# Patient Record
Sex: FEMALE | Race: WHITE | Employment: FULL TIME | URBAN - METROPOLITAN AREA
[De-identification: names, ages, dates, MRNs, and addresses within clinical notes are randomized per-mention and may not be internally consistent; named-entity substitution may affect disease eponyms.]

---

## 2017-01-03 ENCOUNTER — PRENATAL OFFICE VISIT (OUTPATIENT)
Dept: OBGYN | Facility: CLINIC | Age: 37
End: 2017-01-03
Payer: COMMERCIAL

## 2017-01-03 VITALS
BODY MASS INDEX: 31.11 KG/M2 | DIASTOLIC BLOOD PRESSURE: 85 MMHG | SYSTOLIC BLOOD PRESSURE: 131 MMHG | HEART RATE: 106 BPM | TEMPERATURE: 97.6 F | HEIGHT: 63 IN | WEIGHT: 175.6 LBS

## 2017-01-03 DIAGNOSIS — Z34.82 PRENATAL CARE, SUBSEQUENT PREGNANCY, SECOND TRIMESTER: Primary | ICD-10-CM

## 2017-01-03 PROCEDURE — 99207 ZZC PRENATAL VISIT: CPT | Performed by: OBSTETRICS & GYNECOLOGY

## 2017-01-03 PROCEDURE — 99000 SPECIMEN HANDLING OFFICE-LAB: CPT | Performed by: OBSTETRICS & GYNECOLOGY

## 2017-01-03 PROCEDURE — 82105 ALPHA-FETOPROTEIN SERUM: CPT | Mod: 90 | Performed by: OBSTETRICS & GYNECOLOGY

## 2017-01-03 PROCEDURE — 36415 COLL VENOUS BLD VENIPUNCTURE: CPT | Performed by: OBSTETRICS & GYNECOLOGY

## 2017-01-03 NOTE — NURSING NOTE
"Chief Complaint   Patient presents with     Prenatal Care     right side pain on and off       Initial /85 mmHg  Pulse 106  Temp(Src) 97.6  F (36.4  C) (Oral)  Ht 5' 3\" (1.6 m)  Wt 175 lb 9.6 oz (79.652 kg)  BMI 31.11 kg/m2  LMP 09/19/2016  Breastfeeding? No Estimated body mass index is 31.11 kg/(m^2) as calculated from the following:    Height as of this encounter: 5' 3\" (1.6 m).    Weight as of this encounter: 175 lb 9.6 oz (79.652 kg).  BP completed using cuff size: regular  Rhiannon Sue CMA      "

## 2017-01-03 NOTE — PROGRESS NOTES
"CC: Here for routine prenatal visit @ 15w1d   HPI: Has head cold.  Negative Verifi.      PE: /85 mmHg  Pulse 106  Temp(Src) 97.6  F (36.4  C) (Oral)  Ht 5' 3\" (1.6 m)  Wt 175 lb 9.6 oz (79.652 kg)  BMI 31.11 kg/m2  LMP 2016  Breastfeeding? No   See OB flowsheet    Desires AFP    A/P  @ 15w1d normal pregnancy    1. Routine prenatal care.  AFP today.  U/S ordered.  2. URI: discussed supportive care    RTC 4 weeks.      Janie Lantigua M.D.    "

## 2017-01-05 LAB
# FETUSES US: NORMAL
AFP ADJ MOM AMN: 0.69
AFP SERPL-MCNC: 18 NG/ML
AGE - REPORTED: 36.1
DATING METHOD: NORMAL
DIABETIC AT CONCEPTION: NO
FAMILY MEMBER DISEASES HX: NO
FAMILY MEMBER DISEASES HX: NO
GA METHOD: NORMAL
GA: 15.14 WK
HX OF HEREDITARY DISORDERS: NO
IDDM PATIENT QL: NO
INTEGRATED SCN PATIENT-IMP: NORMAL
LMP START DATE: 9
PREV HX CHROMOSOME ABNORMALITY: NO
SPECIMEN DRAWN SERPL: NORMAL
TWINS: NO

## 2017-01-20 ENCOUNTER — MYC MEDICAL ADVICE (OUTPATIENT)
Dept: OBGYN | Facility: CLINIC | Age: 37
End: 2017-01-20

## 2017-01-20 DIAGNOSIS — Z34.82 PRENATAL CARE, SUBSEQUENT PREGNANCY, SECOND TRIMESTER: Primary | ICD-10-CM

## 2017-01-20 RX ORDER — METOCLOPRAMIDE 5 MG/1
TABLET ORAL
Qty: 30 TABLET | Refills: 2 | Status: ON HOLD | OUTPATIENT
Start: 2017-01-20 | End: 2017-06-17

## 2017-01-20 NOTE — TELEPHONE ENCOUNTER
"Dr Yan: Can you work this 17 week PG patient in today for Headache, leg swelling and occasional Dizziness?...    Onset of Headaches: \"2 weeks ago\"    Denies history of Headaches, known injuries, blurred vision, afebrile.      Reports: \"I did have some Preclampsia 6 years ago..\"     Rates pain: \"About a 6 now, but it can get to a 20-more in the am..\"   I & O: \"seems fine. I probably drank 4 -32 oz jugs of water yesterday..\"  Is urinating and reports \"about the usual amounts..\" .     \"I am taking 2 regular Tylenol every 4-6 hours and it helps a while, then the pain comes back.\"     Location: \"right in my forehead, but it is worse in the am, at night I want to vomit the pain is so bad..\" \"I have been vomiting a bit in the mornings the past 2 days, too..\"     \"My left leg is very tingly-like I sat on it all day and now my left leg is swelling again this am..\"    \"I get a bit light headed, too...\"    Advised she will need to be seen. Will check with Dr Yan to see if she can see patient today in Dr Lantigua's absence.     Farida Lucas RN          "

## 2017-01-20 NOTE — TELEPHONE ENCOUNTER
Tried to reach patient by Phone, but patient was not available. I was not able to leave a message. Farida Lucas RN

## 2017-01-20 NOTE — TELEPHONE ENCOUNTER
We can try to use reglan for the headaches to see if that helps at all.  Please call this in for her to her pharmacy of choice.      Her blood pressure was higher than a normal person but not in the range of hypertension, and we don't diagnose pre-eclampsia prior to 20 weeks gestation.  If she continues to have headaches despite taking reglan and tylenol, she probably needs to be seen in the emergency room to make sure she doesn't have something else going on -- they may want to do a brain scan or something similar since we wouldn't attribute it to pre-eclampsia at this early gestational age.     Maria D Yan MD

## 2017-02-02 ENCOUNTER — PRENATAL OFFICE VISIT (OUTPATIENT)
Dept: OBGYN | Facility: CLINIC | Age: 37
End: 2017-02-02
Payer: COMMERCIAL

## 2017-02-02 ENCOUNTER — HOSPITAL ENCOUNTER (OUTPATIENT)
Dept: ULTRASOUND IMAGING | Facility: CLINIC | Age: 37
Discharge: HOME | End: 2017-02-02
Attending: OBSTETRICS & GYNECOLOGY | Admitting: OBSTETRICS & GYNECOLOGY
Payer: COMMERCIAL

## 2017-02-02 VITALS
HEIGHT: 63 IN | WEIGHT: 174.8 LBS | HEART RATE: 89 BPM | BODY MASS INDEX: 30.97 KG/M2 | DIASTOLIC BLOOD PRESSURE: 73 MMHG | SYSTOLIC BLOOD PRESSURE: 113 MMHG

## 2017-02-02 DIAGNOSIS — O09.522 AMA (ADVANCED MATERNAL AGE) MULTIGRAVIDA 35+, SECOND TRIMESTER: ICD-10-CM

## 2017-02-02 DIAGNOSIS — Z34.82 PRENATAL CARE, SUBSEQUENT PREGNANCY, SECOND TRIMESTER: ICD-10-CM

## 2017-02-02 DIAGNOSIS — Z34.82 PRENATAL CARE, SUBSEQUENT PREGNANCY, SECOND TRIMESTER: Primary | ICD-10-CM

## 2017-02-02 PROCEDURE — 76805 OB US >/= 14 WKS SNGL FETUS: CPT

## 2017-02-02 PROCEDURE — 99207 ZZC PRENATAL VISIT: CPT | Performed by: OBSTETRICS & GYNECOLOGY

## 2017-02-02 NOTE — PROGRESS NOTES
"CC: Here for routine prenatal visit @ 19w3d   HPI: + FM/flutter, no ctx, no LOF, no VB.  Gets occasional sharp pains     PE: /73 mmHg  Pulse 89  Ht 5' 3\" (1.6 m)  Wt 174 lb 12.8 oz (79.289 kg)  BMI 30.97 kg/m2  LMP 2016  Breastfeeding? No   See OB flowsheet    AFP screen negative  U/S: WNL except EIF    A/P  @ 19w3d normal pregnancy    1. Routine prenatal care.  Normal Verifi.  Patient reassured regarding finding of EIF.  No other structural defects noted.     RTC 4 weeks.      Janie Lantigua M.D.    "

## 2017-02-02 NOTE — NURSING NOTE
"Chief Complaint   Patient presents with     Prenatal Care     pain on upper left abdomin, also having a hard time eating       Initial /73 mmHg  Pulse 89  Ht 5' 3\" (1.6 m)  Wt 174 lb 12.8 oz (79.289 kg)  BMI 30.97 kg/m2  LMP 09/19/2016  Breastfeeding? No Estimated body mass index is 30.97 kg/(m^2) as calculated from the following:    Height as of this encounter: 5' 3\" (1.6 m).    Weight as of this encounter: 174 lb 12.8 oz (79.289 kg).  BP completed using cuff size: regular  Rhiannno Sue CMA      "

## 2017-02-10 ENCOUNTER — MYC MEDICAL ADVICE (OUTPATIENT)
Dept: OBGYN | Facility: CLINIC | Age: 37
End: 2017-02-10

## 2017-02-10 ENCOUNTER — HOSPITAL ENCOUNTER (OUTPATIENT)
Dept: OBGYN | Facility: HOSPITAL | Age: 37
Discharge: HOME | End: 2017-02-10
Attending: FAMILY MEDICINE | Admitting: FAMILY MEDICINE

## 2017-02-10 DIAGNOSIS — R10.9 FLANK PAIN: ICD-10-CM

## 2017-02-10 ASSESSMENT — MIFFLIN-ST. JEOR: SCORE: 1448.79

## 2017-02-10 NOTE — TELEPHONE ENCOUNTER
Spoke with patient on the phone.    S-(situation): left sided pain under rib cage/breast, worse at night, radiates to back    B-(background):  at about 20.4 weeks pregnant    A-(assessment): Patient reports headaches off and on, nothing consistent, since about 17 weeks. Patient denies any visual changes. Patient reports pain is on left side under breast. Saw MD 2 weeks ago and discussed. Patient repots pain has been worsening, especially at night. Now almost unbearable.  Patient reports at night, pain increased, difficult to sleep or lay down.   Patient reports Tylenol is not helping, has not tried heat.  Patient reports some swelling in feet that comes and goes, more when she is on her feet all day. Patient reports + fetal movement. Patient is not bleeding or having any leaking of fluid. Patient reports she has not been sick, denies fevers or coughing. Patient reports some times the pain makes it difficult to breathe deep.    R-(recommendations): Advised patient to be seen for further evaluation. Patient reports understanding.    Chandni Hair   [facility]  RN

## 2017-03-02 ENCOUNTER — PRENATAL OFFICE VISIT (OUTPATIENT)
Dept: OBGYN | Facility: CLINIC | Age: 37
End: 2017-03-02
Payer: COMMERCIAL

## 2017-03-02 VITALS
HEIGHT: 63 IN | WEIGHT: 179.6 LBS | SYSTOLIC BLOOD PRESSURE: 115 MMHG | BODY MASS INDEX: 31.82 KG/M2 | HEART RATE: 107 BPM | DIASTOLIC BLOOD PRESSURE: 78 MMHG

## 2017-03-02 DIAGNOSIS — O09.522 AMA (ADVANCED MATERNAL AGE) MULTIGRAVIDA 35+, SECOND TRIMESTER: ICD-10-CM

## 2017-03-02 DIAGNOSIS — Z34.82 PRENATAL CARE, SUBSEQUENT PREGNANCY, SECOND TRIMESTER: ICD-10-CM

## 2017-03-02 LAB
GLUCOSE 1H P 50 G GLC PO SERPL-MCNC: 134 MG/DL (ref 60–129)
HGB BLD-MCNC: 10 G/DL (ref 11.7–15.7)

## 2017-03-02 PROCEDURE — 36415 COLL VENOUS BLD VENIPUNCTURE: CPT | Performed by: OBSTETRICS & GYNECOLOGY

## 2017-03-02 PROCEDURE — 99207 ZZC PRENATAL VISIT: CPT | Performed by: OBSTETRICS & GYNECOLOGY

## 2017-03-02 PROCEDURE — 82950 GLUCOSE TEST: CPT | Performed by: OBSTETRICS & GYNECOLOGY

## 2017-03-02 PROCEDURE — 00000218 ZZHCL STATISTIC OBHBG - HEMOGLOBIN: Performed by: OBSTETRICS & GYNECOLOGY

## 2017-03-02 NOTE — PROGRESS NOTES
"CC: Here for routine prenatal visit @ 23w3d   HPI: + FM, no ctx, no LOF, no VB.  No complaints.     PE: /78 (BP Location: Left arm, Patient Position: Chair, Cuff Size: Adult Large)  Pulse 107  Ht 5' 3\" (1.6 m)  Wt 179 lb 9.6 oz (81.5 kg)  LMP 2016  Breastfeeding? No  BMI 31.81 kg/m2   See OB flowsheet    GTT in progress    A/P  @ 23w3d normal pregnancy    1. Routine prenatal care.  Syphilis is a sexually transmitted disease that can cause birth defects in the babies of untreated mothers. Every pregnant patient is tested for syphilis early in each pregnancy as part of the routine lab work. The [place] [facility] of [facility] has seen an increase in the rate of syphilis in [place]. The [facility] now recommends testing for syphilis 3 times during a pregnancy, the new prenatal visit, 28 weeks and when admitted for delivery. Patient declines lab testing for syphilis.     RTC 4 weeks.      Janie Lantigua M.D.    "

## 2017-03-02 NOTE — NURSING NOTE
"Chief Complaint   Patient presents with     Prenatal Care       Initial /78 (BP Location: Left arm, Patient Position: Chair, Cuff Size: Adult Large)  Pulse 107  Ht 5' 3\" (1.6 m)  Wt 179 lb 9.6 oz (81.5 kg)  LMP 09/19/2016  Breastfeeding? No  BMI 31.81 kg/m2 Estimated body mass index is 31.81 kg/(m^2) as calculated from the following:    Height as of this encounter: 5' 3\" (1.6 m).    Weight as of this encounter: 179 lb 9.6 oz (81.5 kg).  Medication Reconciliation: complete   Sheri Russo, SEUN      "

## 2017-03-02 NOTE — MR AVS SNAPSHOT
After Visit Summary   3/2/2017    Jennifer A Schwab    MRN: [id number]           Patient Information     Date Of Birth          1980        Visit Information        Provider Department      3/2/2017 1:15 PM Janie Lantigua MD [facility]        Today's Diagnoses     AMA (advanced maternal age) multigravida 35+, second trimester        Prenatal care, subsequent pregnancy, second trimester           Follow-ups after your visit        Your next 10 appointments already scheduled     Apr 04, 2017  3:45 PM CDT   ESTABLISHED PRENATAL with Janie Lantigua MD   [facility]    [address] [address]   [phone]              Who to contact     If you have questions or need follow up information about today's clinic visit or your schedule please contact [facility] directly at [phone].  Normal or non-critical lab and imaging results will be communicated to you by MyChart, letter or phone within 4 business days after the clinic has received the results. If you do not hear from us within 7 days, please contact the clinic through Clarityhart or phone. If you have a critical or abnormal lab result, we will notify you by phone as soon as possible.  Submit refill requests through Inc[employer] or call your pharmacy and they will forward the refill request to us. Please allow 3 business days for your refill to be completed.          Additional Information About Your Visit        MyChart Information     [employer] gives you secure access to your electronic health record. If you see a primary care provider, you can also send messages to your care team and make appointments. If you have questions, please call your primary care clinic.  If you do not have a primary care provider, please call [phone] and they will assist you.        Care EveryWhere ID     This is your Care EveryWhere ID. This could be used by other  "organizations to access your [place] medical records  [id number]        Your Vitals Were     Pulse Height Last Period Breastfeeding? BMI (Body Mass Index)       107 5' 3\" (1.6 m) 09/19/2016 No 31.81 kg/m2        Blood Pressure from Last 3 Encounters:   03/02/17 115/78   02/02/17 113/73   01/03/17 131/85    Weight from Last 3 Encounters:   03/02/17 179 lb 9.6 oz (81.5 kg)   02/02/17 174 lb 12.8 oz (79.3 kg)   01/03/17 175 lb 9.6 oz (79.7 kg)              We Performed the Following     Glucose tolerance gest screen 1 hour     OB hemoglobin        Primary Care Provider Office Phone # Fax #    Janie Lantigua [phone]       [facility] [address]  [place] [address]        Thank you!     Thank you for choosing [facility]  for your care. Our goal is always to provide you with excellent care. Hearing back from our patients is one way we can continue to improve our services. Please take a few minutes to complete the written survey that you may receive in the mail after your visit with us. Thank you!             Your Updated Medication List - Protect others around you: Learn how to safely use, store and throw away your medicines at www.disposemymeds.org.          This list is accurate as of: 3/2/17  2:00 PM.  Always use your most recent med list.                   Brand Name Dispense Instructions for use    cetirizine 10 MG tablet    zyrTEC     Take 10 mg by mouth daily       metoclopramide 5 MG tablet    REGLAN    30 tablet    5-10 mg by mouth every 6 hours as needed for headache       nicotine 7 MG/24HR 24 hr patch    NICODERM CQ    30 patch    Place 1 patch onto the skin every 24 hours       ondansetron 4 MG ODT tab    ZOFRAN ODT    30 tablet    Take 1-2 tablets (4-8 mg) by mouth every 6 hours as needed for nausea       prenatal multivitamin  plus iron 27-0.8 MG Tabs per tablet      Take 1 tablet by mouth daily       PREVACID PO      Take 15 mg by mouth every " morning (before breakfast)

## 2017-03-06 ENCOUNTER — TELEPHONE (OUTPATIENT)
Dept: OBGYN | Facility: CLINIC | Age: 37
End: 2017-03-06

## 2017-03-06 DIAGNOSIS — Z34.82 PRENATAL CARE, SUBSEQUENT PREGNANCY, SECOND TRIMESTER: ICD-10-CM

## 2017-03-06 LAB
GLUCOSE 1H P 100 G GLC PO SERPL-MCNC: 133 MG/DL (ref 60–179)
GLUCOSE 2H P 100 G GLC PO SERPL-MCNC: 100 MG/DL (ref 60–154)
GLUCOSE 3H P 100 G GLC PO SERPL-MCNC: 45 MG/DL (ref 60–139)
GLUCOSE P FAST SERPL-MCNC: 74 MG/DL (ref 60–94)

## 2017-03-06 PROCEDURE — 82952 GTT-ADDED SAMPLES: CPT | Performed by: OBSTETRICS & GYNECOLOGY

## 2017-03-06 PROCEDURE — 36415 COLL VENOUS BLD VENIPUNCTURE: CPT | Performed by: OBSTETRICS & GYNECOLOGY

## 2017-03-06 PROCEDURE — 82951 GLUCOSE TOLERANCE TEST (GTT): CPT | Performed by: OBSTETRICS & GYNECOLOGY

## 2017-03-06 NOTE — TELEPHONE ENCOUNTER
"Patient notified she passed the 3 hr gtt.  Patient is feeling better.  She was able to eat since the testing finished and is doing \" a lot better than when I left.\"    Chandni Hair   [facility]  RN    "

## 2017-03-06 NOTE — TELEPHONE ENCOUNTER
Reason for Call:  Other      Detailed comments: Pt returning call - She thinks she was called regarding lab results    Phone Number Patient can be reached at: Home number on file [phone] (home)    Best Time: Any    Can we leave a detailed message on this number? Not Applicable    Call taken on 3/6/2017 at 1:33 PM by Denise Behrendt

## 2017-04-04 ENCOUNTER — PRENATAL OFFICE VISIT (OUTPATIENT)
Dept: OBGYN | Facility: CLINIC | Age: 37
End: 2017-04-04
Payer: COMMERCIAL

## 2017-04-04 VITALS
HEART RATE: 103 BPM | SYSTOLIC BLOOD PRESSURE: 117 MMHG | DIASTOLIC BLOOD PRESSURE: 78 MMHG | HEIGHT: 63 IN | WEIGHT: 182.2 LBS | BODY MASS INDEX: 32.28 KG/M2

## 2017-04-04 DIAGNOSIS — Z34.83 PRENATAL CARE, SUBSEQUENT PREGNANCY, THIRD TRIMESTER: Primary | ICD-10-CM

## 2017-04-04 DIAGNOSIS — Z23 NEED FOR TDAP VACCINATION: ICD-10-CM

## 2017-04-04 PROCEDURE — 99207 ZZC PRENATAL VISIT: CPT | Performed by: OBSTETRICS & GYNECOLOGY

## 2017-04-04 PROCEDURE — 90715 TDAP VACCINE 7 YRS/> IM: CPT | Performed by: OBSTETRICS & GYNECOLOGY

## 2017-04-04 PROCEDURE — 90471 IMMUNIZATION ADMIN: CPT | Performed by: OBSTETRICS & GYNECOLOGY

## 2017-04-04 NOTE — PROGRESS NOTES
"CC: Here for routine prenatal visit @ 28w1d   HPI: + FM, no ctx, no LOF, no VB.  Back issues and lots of pressure     PE: /78 (BP Location: Left arm, Patient Position: Chair, Cuff Size: Adult Regular)  Pulse 103  Ht 5' 3\" (1.6 m)  Wt 182 lb 3.2 oz (82.6 kg)  LMP 2016  Breastfeeding? No  BMI 32.28 kg/m2   See OB flowsheet    A/P  @ 28w1d normal pregnancy    1. Routine prenatal care.  Discussed aches/pains of pregnancy.  Gave pregnancy exercises to patient.  Discussed possible SI belt.  Patient declines for now.     RTC 4 weeks.      Janie Lantigua M.D.    "

## 2017-04-04 NOTE — NURSING NOTE
"Chief Complaint   Patient presents with     Prenatal Care     has been having a lot of back pain and low pelvic pressure, also concerned that baby is still breach       Initial /78 (BP Location: Left arm, Patient Position: Chair, Cuff Size: Adult Regular)  Pulse 103  Ht 5' 3\" (1.6 m)  Wt 182 lb 3.2 oz (82.6 kg)  LMP 09/19/2016  Breastfeeding? No  BMI 32.28 kg/m2 Estimated body mass index is 32.28 kg/(m^2) as calculated from the following:    Height as of this encounter: 5' 3\" (1.6 m).    Weight as of this encounter: 182 lb 3.2 oz (82.6 kg).  Medication Reconciliation: complete   Rhiannon Sue CMA      "

## 2017-04-04 NOTE — MR AVS SNAPSHOT
After Visit Summary   4/4/2017    Jennifer A Schwab    MRN: [id number]           Patient Information     Date Of Birth          1980        Visit Information        Provider Department      4/4/2017 3:45 PM Janie Lantigua MD [facility]        Today's Diagnoses     Prenatal care, subsequent pregnancy, third trimester    -  1    Need for Tdap vaccination           Follow-ups after your visit        Your next 10 appointments already scheduled     Apr 18, 2017  9:30 AM CDT   ESTABLISHED PRENATAL with Janie Lantigua MD   [facility]    [address] [address]   [phone]            May 02, 2017  4:00 PM CDT   ESTABLISHED PRENATAL with Janie Lantigua MD   [facility]    [address] [address]   [phone]            May 16, 2017  4:00 PM CDT   ESTABLISHED PRENATAL with Janie Lantigua MD   [facility]    [address] [address]   [phone]            May 30, 2017  4:00 PM CDT   ESTABLISHED PRENATAL with Janie Lantigua MD   [facility]    [address] [address]   [phone]              Who to contact     If you have questions or need follow up information about today's clinic visit or your schedule please contact [facility] directly at [phone].  Normal or non-critical lab and imaging results will be communicated to you by [employer] - Smartphone RFIDhart, letter or phone within 4 business days after the clinic has received the results. If you do not hear from us within 7 days, please contact the clinic through [employer] - Smartphone RFIDhart or phone. If you have a critical or abnormal lab result, we will notify you by phone as soon as possible.  Submit refill requests through eMagin or call your pharmacy and they will forward the refill  "request to us. Please allow 3 business days for your refill to be completed.          Additional Information About Your Visit        [employer] Information     [employer] gives you secure access to your electronic health record. If you see a primary care provider, you can also send messages to your care team and make appointments. If you have questions, please call your primary care clinic.  If you do not have a primary care provider, please call [phone] and they will assist you.        Care EveryWhere ID     This is your Care EveryWhere ID. This could be used by other organizations to access your [place] medical records  [id number]        Your Vitals Were     Pulse Height Last Period Breastfeeding? BMI (Body Mass Index)       103 5' 3\" (1.6 m) 09/19/2016 No 32.28 kg/m2        Blood Pressure from Last 3 Encounters:   04/04/17 117/78   03/02/17 115/78   02/02/17 113/73    Weight from Last 3 Encounters:   04/04/17 182 lb 3.2 oz (82.6 kg)   03/02/17 179 lb 9.6 oz (81.5 kg)   02/02/17 174 lb 12.8 oz (79.3 kg)              We Performed the Following     ADMIN 1st VACCINE     TDAP VACCINE (ADACEL)        Primary Care Provider Office Phone # Fax #    Janie Lantigua [phone]       [facility] [address] [address]        Thank you!     Thank you for choosing [facility]  for your care. Our goal is always to provide you with excellent care. Hearing back from our patients is one way we can continue to improve our services. Please take a few minutes to complete the written survey that you may receive in the mail after your visit with us. Thank you!             Your Updated Medication List - Protect others around you: Learn how to safely use, store and throw away your medicines at www.disposemymeds.org.          This list is accurate as of: 4/4/17  5:13 PM.  Always use your most recent med list.                   Brand Name Dispense Instructions for use    " cetirizine 10 MG tablet    zyrTEC     Take 10 mg by mouth daily       metoclopramide 5 MG tablet    REGLAN    30 tablet    5-10 mg by mouth every 6 hours as needed for headache       nicotine 7 MG/24HR 24 hr patch    NICODERM CQ    30 patch    Place 1 patch onto the skin every 24 hours       ondansetron 4 MG ODT tab    ZOFRAN ODT    30 tablet    Take 1-2 tablets (4-8 mg) by mouth every 6 hours as needed for nausea       prenatal multivitamin  plus iron 27-0.8 MG Tabs per tablet      Take 1 tablet by mouth daily       PREVACID PO      Take 15 mg by mouth every morning (before breakfast)

## 2017-04-18 ENCOUNTER — PRENATAL OFFICE VISIT (OUTPATIENT)
Dept: OBGYN | Facility: CLINIC | Age: 37
End: 2017-04-18
Payer: COMMERCIAL

## 2017-04-18 VITALS
WEIGHT: 182 LBS | SYSTOLIC BLOOD PRESSURE: 105 MMHG | HEART RATE: 104 BPM | DIASTOLIC BLOOD PRESSURE: 67 MMHG | BODY MASS INDEX: 32.25 KG/M2 | HEIGHT: 63 IN

## 2017-04-18 DIAGNOSIS — Z34.83 PRENATAL CARE, SUBSEQUENT PREGNANCY, THIRD TRIMESTER: Primary | ICD-10-CM

## 2017-04-18 PROCEDURE — 99207 ZZC PRENATAL VISIT: CPT | Performed by: OBSTETRICS & GYNECOLOGY

## 2017-04-18 NOTE — MR AVS SNAPSHOT
After Visit Summary   4/18/2017    Jennifer A Schwab    MRN: [id number]           Patient Information     Date Of Birth          1980        Visit Information        Provider Department      4/18/2017 9:30 AM Janie Lantigua MD [facility]        Today's Diagnoses     Prenatal care, subsequent pregnancy, third trimester    -  1      Care Instructions    Return in two weeks.        Follow-ups after your visit        Your next 10 appointments already scheduled     May 02, 2017  4:00 PM CDT   ESTABLISHED PRENATAL with Janie Lantigua MD   [facility] ([facility]    [address] [address]   [phone]            May 16, 2017  4:00 PM CDT   ESTABLISHED PRENATAL with Janie Lantigua MD   [facility] ([facility]    [address] [address]   [phone]            May 30, 2017  4:00 PM CDT   ESTABLISHED PRENATAL with Janie Lantigua MD   [facility] ([facility])    [address] [address]   [phone]              Who to contact     If you have questions or need follow up information about today's clinic visit or your schedule please contact [facility] directly at [phone].  Normal or non-critical lab and imaging results will be communicated to you by MyChart, letter or phone within 4 business days after the clinic has received the results. If you do not hear from us within 7 days, please contact the clinic through MyChart or phone. If you have a critical or abnormal lab result, we will notify you by phone as soon as possible.  Submit refill requests through [employer] or call your pharmacy and they will forward the refill request to us. Please allow 3 business days for your refill to be completed.          Additional Information About Your Visit        [employer] Information     [employer] gives you secure access to  "your electronic health record. If you see a primary care provider, you can also send messages to your care team and make appointments. If you have questions, please call your primary care clinic.  If you do not have a primary care provider, please call [phone] and they will assist you.        Care EveryWhere ID     This is your Care EveryWhere ID. This could be used by other organizations to access your [place] medical records  [id number]        Your Vitals Were     Pulse Height Last Period BMI (Body Mass Index)          104 5' 3\" (1.6 m) 09/19/2016 32.24 kg/m2         Blood Pressure from Last 3 Encounters:   04/18/17 105/67   04/04/17 117/78   03/02/17 115/78    Weight from Last 3 Encounters:   04/18/17 182 lb (82.6 kg)   04/04/17 182 lb 3.2 oz (82.6 kg)   03/02/17 179 lb 9.6 oz (81.5 kg)              Today, you had the following     No orders found for display       Primary Care Provider Office Phone # Fax #    Janie Lantigua [phone]       [facility] [address] [place] [address]        Thank you!     Thank you for choosing [facility]  for your care. Our goal is always to provide you with excellent care. Hearing back from our patients is one way we can continue to improve our services. Please take a few minutes to complete the written survey that you may receive in the mail after your visit with us. Thank you!             Your Updated Medication List - Protect others around you: Learn how to safely use, store and throw away your medicines at www.disposemymeds.org.          This list is accurate as of: 4/18/17  9:50 AM.  Always use your most recent med list.                   Brand Name Dispense Instructions for use    cetirizine 10 MG tablet    zyrTEC     Take 10 mg by mouth daily       metoclopramide 5 MG tablet    REGLAN    30 tablet    5-10 mg by mouth every 6 hours as needed for headache       nicotine 7 MG/24HR 24 hr patch    NICODERM CQ    30 " patch    Place 1 patch onto the skin every 24 hours       ondansetron 4 MG ODT tab    ZOFRAN ODT    30 tablet    Take 1-2 tablets (4-8 mg) by mouth every 6 hours as needed for nausea       prenatal multivitamin  plus iron 27-0.8 MG Tabs per tablet      Take 1 tablet by mouth daily       PREVACID PO      Take 15 mg by mouth every morning (before breakfast)

## 2017-04-18 NOTE — NURSING NOTE
"Initial /67 (BP Location: Right arm, Patient Position: Chair, Cuff Size: Adult Large)  Pulse 104  Ht 5' 3\" (1.6 m)  Wt 182 lb (82.6 kg)  LMP 09/19/2016  BMI 32.24 kg/m2 Estimated body mass index is 32.24 kg/(m^2) as calculated from the following:    Height as of this encounter: 5' 3\" (1.6 m).    Weight as of this encounter: 182 lb (82.6 kg). .      "

## 2017-04-18 NOTE — PROGRESS NOTES
"CC: Here for routine prenatal visit @ 30w1d   HPI: + FM, no ctx, no LOF, no VB.  No new complaints.  Back has calmed down a little.    PE: /67 (BP Location: Right arm, Patient Position: Chair, Cuff Size: Adult Large)  Pulse 104  Ht 5' 3\" (1.6 m)  Wt 182 lb (82.6 kg)  LMP 2016  BMI 32.24 kg/m2   See OB flowsheet    A/P  @ 30w1d normal pregnancy    1. Routine prenatal care    RTC 2 weeks.      Janie Lantigua M.D.    "

## 2017-05-02 ENCOUNTER — PRENATAL OFFICE VISIT (OUTPATIENT)
Dept: OBGYN | Facility: CLINIC | Age: 37
End: 2017-05-02
Payer: COMMERCIAL

## 2017-05-02 VITALS
HEIGHT: 63 IN | DIASTOLIC BLOOD PRESSURE: 58 MMHG | SYSTOLIC BLOOD PRESSURE: 103 MMHG | HEART RATE: 96 BPM | BODY MASS INDEX: 32.78 KG/M2 | WEIGHT: 185 LBS

## 2017-05-02 DIAGNOSIS — O09.523 AMA (ADVANCED MATERNAL AGE) MULTIGRAVIDA 35+, THIRD TRIMESTER: ICD-10-CM

## 2017-05-02 DIAGNOSIS — Z34.83 PRENATAL CARE, SUBSEQUENT PREGNANCY, THIRD TRIMESTER: ICD-10-CM

## 2017-05-02 PROCEDURE — 99207 ZZC PRENATAL VISIT: CPT | Performed by: OBSTETRICS & GYNECOLOGY

## 2017-05-02 NOTE — NURSING NOTE
"Initial /58 (BP Location: Right arm, Patient Position: Chair, Cuff Size: Adult Large)  Pulse 96  Ht 5' 3\" (1.6 m)  Wt 185 lb (83.9 kg)  LMP 09/19/2016  BMI 32.77 kg/m2 Estimated body mass index is 32.77 kg/(m^2) as calculated from the following:    Height as of this encounter: 5' 3\" (1.6 m).    Weight as of this encounter: 185 lb (83.9 kg). .      "

## 2017-05-02 NOTE — MR AVS SNAPSHOT
After Visit Summary   5/2/2017    Jennifer A Schwab    MRN: [id number]           Patient Information     Date Of Birth          1980        Visit Information        Provider Department      5/2/2017 4:00 PM Janie Lantigua MD [facility]        Today's Diagnoses     Prenatal care, subsequent pregnancy, third trimester        AMA (advanced maternal age) multigravida 35+, third trimester          Care Instructions    Return in two weeks.        Follow-ups after your visit        Your next 10 appointments already scheduled     May 16, 2017  4:00 PM CDT   ESTABLISHED PRENATAL with Janie Lantigua MD   [facility] ([facility])    [address] [address]   [phone]            May 30, 2017  4:00 PM CDT   ESTABLISHED PRENATAL with Janie Lantigua MD   [facility] ([facility])    [address] [address]   [phone]              Who to contact     If you have questions or need follow up information about today's clinic visit or your schedule please contact [facility] directly at [phone].  Normal or non-critical lab and imaging results will be communicated to you by MyChart, letter or phone within 4 business days after the clinic has received the results. If you do not hear from us within 7 days, please contact the clinic through [employer] or phone. If you have a critical or abnormal lab result, we will notify you by phone as soon as possible.  Submit refill requests through [employer] or call your pharmacy and they will forward the refill request to us. Please allow 3 business days for your refill to be completed.          Additional Information About Your Visit        MyChart Information     [employer] gives you secure access to your electronic health record. If you see a primary care provider, you can also send messages to your care team and make appointments. If you  "have questions, please call your primary care clinic.  If you do not have a primary care provider, please call [phone] and they will assist you.        Care EveryWhere ID     This is your Care EveryWhere ID. This could be used by other organizations to access your [place] medical records  [id number]        Your Vitals Were     Pulse Height Last Period BMI (Body Mass Index)          96 5' 3\" (1.6 m) 09/19/2016 32.77 kg/m2         Blood Pressure from Last 3 Encounters:   05/02/17 103/58   04/18/17 105/67   04/04/17 117/78    Weight from Last 3 Encounters:   05/02/17 185 lb (83.9 kg)   04/18/17 182 lb (82.6 kg)   04/04/17 182 lb 3.2 oz (82.6 kg)              Today, you had the following     No orders found for display       Primary Care Provider Office Phone # Fax #    Janie Lantigua [phone]       [facility] [address] [place] [address]        Thank you!     Thank you for choosing [facility]  for your care. Our goal is always to provide you with excellent care. Hearing back from our patients is one way we can continue to improve our services. Please take a few minutes to complete the written survey that you may receive in the mail after your visit with us. Thank you!             Your Updated Medication List - Protect others around you: Learn how to safely use, store and throw away your medicines at www.disposemymeds.org.          This list is accurate as of: 5/2/17  4:52 PM.  Always use your most recent med list.                   Brand Name Dispense Instructions for use    cetirizine 10 MG tablet    zyrTEC     Take 10 mg by mouth daily       metoclopramide 5 MG tablet    REGLAN    30 tablet    5-10 mg by mouth every 6 hours as needed for headache       nicotine 7 MG/24HR 24 hr patch    NICODERM CQ    30 patch    Place 1 patch onto the skin every 24 hours       ondansetron 4 MG ODT tab    ZOFRAN ODT    30 tablet    Take 1-2 tablets (4-8 mg) by mouth " every 6 hours as needed for nausea       prenatal multivitamin  plus iron 27-0.8 MG Tabs per tablet      Take 1 tablet by mouth daily Reported on 5/2/2017       PREVACID PO      Take 15 mg by mouth every morning (before breakfast)

## 2017-05-02 NOTE — PROGRESS NOTES
"CC: Here for routine prenatal visit @ 32w1d   HPI: + FM, no ctx, no LOF, no VB.  No new complaints.     PE: /58 (BP Location: Right arm, Patient Position: Chair, Cuff Size: Adult Large)  Pulse 96  Ht 5' 3\" (1.6 m)  Wt 185 lb (83.9 kg)  LMP 2016  BMI 32.77 kg/m2   See OB flowsheet    A/P  @ 32w1d normal pregnancy    1. Routine prenatal care    RTC 2 weeks.      Janie Lantigua M.D.    "

## 2017-05-16 ENCOUNTER — PRENATAL OFFICE VISIT (OUTPATIENT)
Dept: OBGYN | Facility: CLINIC | Age: 37
End: 2017-05-16
Payer: COMMERCIAL

## 2017-05-16 VITALS
WEIGHT: 184.2 LBS | BODY MASS INDEX: 32.63 KG/M2 | HEART RATE: 99 BPM | DIASTOLIC BLOOD PRESSURE: 67 MMHG | SYSTOLIC BLOOD PRESSURE: 110 MMHG

## 2017-05-16 DIAGNOSIS — O09.523 AMA (ADVANCED MATERNAL AGE) MULTIGRAVIDA 35+, THIRD TRIMESTER: ICD-10-CM

## 2017-05-16 DIAGNOSIS — Z34.83 PRENATAL CARE, SUBSEQUENT PREGNANCY, THIRD TRIMESTER: Primary | ICD-10-CM

## 2017-05-16 PROCEDURE — 99207 ZZC PRENATAL VISIT: CPT | Performed by: OBSTETRICS & GYNECOLOGY

## 2017-05-16 NOTE — PROGRESS NOTES
Doing well.   Reports intense pelvic pressure  Denies VB, ctx, LOF. +FM  /67 (BP Location: Right arm, Patient Position: Chair, Cuff Size: Adult Regular)  Pulse 99  Wt 184 lb 3.2 oz (83.6 kg)  LMP 2016  BMI 32.63 kg/m2  General Appearance: NAD  Abdomen: Gravid, NT  Refer to flow sheet above.   A/P: 36 year old  at 34w1d  -- concerns addressed above, reassurance provided  -- PTL precautions reviewed  RTC in 2 weeks     Thong Reed MD  [facility]

## 2017-05-16 NOTE — MR AVS SNAPSHOT
After Visit Summary   5/16/2017    Jennifer A Schwab    MRN: [id number]           Patient Information     Date Of Birth          1980        Visit Information        Provider Department      5/16/2017 4:00 PM Thong Reed MD [facility]        Today's Diagnoses     Prenatal care, subsequent pregnancy, third trimester    -  1    AMA (advanced maternal age) multigravida 35+, third trimester           Follow-ups after your visit        Your next 10 appointments already scheduled     May 30, 2017  4:00 PM CDT   ESTABLISHED PRENATAL with Janie Lantigua MD   [facility] ([facility])    [address] [address] [phone]              Who to contact     If you have questions or need follow up information about today's clinic visit or your schedule please contact [facility] directly at [phone].  Normal or non-critical lab and imaging results will be communicated to you by MyChart, letter or phone within 4 business days after the clinic has received the results. If you do not hear from us within 7 days, please contact the clinic through [employer] or phone. If you have a critical or abnormal lab result, we will notify you by phone as soon as possible.  Submit refill requests through [employer] or call your pharmacy and they will forward the refill request to us. Please allow 3 business days for your refill to be completed.          Additional Information About Your Visit        MyChart Information     [employer] gives you secure access to your electronic health record. If you see a primary care provider, you can also send messages to your care team and make appointments. If you have questions, please call your primary care clinic.  If you do not have a primary care provider, please call [phone] and they will assist you.        Care EveryWhere ID     This is your Care EveryWhere ID. This could be used by other  organizations to access your [place] medical records  [id number]        Your Vitals Were     Pulse Last Period BMI (Body Mass Index)             99 09/19/2016 32.63 kg/m2          Blood Pressure from Last 3 Encounters:   05/16/17 110/67   05/02/17 103/58   04/18/17 105/67    Weight from Last 3 Encounters:   05/16/17 184 lb 3.2 oz (83.6 kg)   05/02/17 185 lb (83.9 kg)   04/18/17 182 lb (82.6 kg)              Today, you had the following     No orders found for display       Primary Care Provider Office Phone # Fax #    Janie Lantigua [phone]       [facility] [address] [address]        Thank you!     Thank you for choosing [facility]  for your care. Our goal is always to provide you with excellent care. Hearing back from our patients is one way we can continue to improve our services. Please take a few minutes to complete the written survey that you may receive in the mail after your visit with us. Thank you!             Your Updated Medication List - Protect others around you: Learn how to safely use, store and throw away your medicines at www.disposemymeds.org.          This list is accurate as of: 5/16/17  4:46 PM.  Always use your most recent med list.                   Brand Name Dispense Instructions for use    cetirizine 10 MG tablet    zyrTEC     Take 10 mg by mouth daily       metoclopramide 5 MG tablet    REGLAN    30 tablet    5-10 mg by mouth every 6 hours as needed for headache       nicotine 7 MG/24HR 24 hr patch    NICODERM CQ    30 patch    Place 1 patch onto the skin every 24 hours       ondansetron 4 MG ODT tab    ZOFRAN ODT    30 tablet    Take 1-2 tablets (4-8 mg) by mouth every 6 hours as needed for nausea       prenatal multivitamin  plus iron 27-0.8 MG Tabs per tablet      Take 1 tablet by mouth daily Reported on 5/2/2017       PREVACID PO      Take 15 mg by mouth every morning (before breakfast)

## 2017-05-16 NOTE — NURSING NOTE
"Initial /67 (BP Location: Right arm, Patient Position: Chair, Cuff Size: Adult Regular)  Pulse 99  Wt 184 lb 3.2 oz (83.6 kg)  LMP 09/19/2016  BMI 32.63 kg/m2 Estimated body mass index is 32.63 kg/(m^2) as calculated from the following:    Height as of 5/2/17: 5' 3\" (1.6 m).    Weight as of this encounter: 184 lb 3.2 oz (83.6 kg). .    "

## 2017-05-30 ENCOUNTER — PRENATAL OFFICE VISIT (OUTPATIENT)
Dept: OBGYN | Facility: CLINIC | Age: 37
End: 2017-05-30
Payer: COMMERCIAL

## 2017-05-30 ENCOUNTER — HOSPITAL ENCOUNTER (OUTPATIENT)
Dept: ULTRASOUND IMAGING | Facility: CLINIC | Age: 37
Discharge: HOME | End: 2017-05-30
Attending: OBSTETRICS & GYNECOLOGY | Admitting: OBSTETRICS & GYNECOLOGY
Payer: COMMERCIAL

## 2017-05-30 VITALS
HEART RATE: 105 BPM | SYSTOLIC BLOOD PRESSURE: 110 MMHG | DIASTOLIC BLOOD PRESSURE: 77 MMHG | WEIGHT: 187 LBS | HEIGHT: 63 IN | TEMPERATURE: 98.8 F | BODY MASS INDEX: 33.13 KG/M2

## 2017-05-30 DIAGNOSIS — M79.605 PAIN AND SWELLING OF LEFT LOWER EXTREMITY: ICD-10-CM

## 2017-05-30 DIAGNOSIS — M79.89 PAIN AND SWELLING OF LEFT LOWER EXTREMITY: ICD-10-CM

## 2017-05-30 DIAGNOSIS — Z34.83 PRENATAL CARE, SUBSEQUENT PREGNANCY, THIRD TRIMESTER: Primary | ICD-10-CM

## 2017-05-30 PROCEDURE — 93971 EXTREMITY STUDY: CPT | Mod: LT

## 2017-05-30 PROCEDURE — 99207 ZZC PRENATAL VISIT: CPT | Performed by: OBSTETRICS & GYNECOLOGY

## 2017-05-30 PROCEDURE — 87653 STREP B DNA AMP PROBE: CPT | Performed by: OBSTETRICS & GYNECOLOGY

## 2017-05-30 NOTE — MR AVS SNAPSHOT
After Visit Summary   5/30/2017    Jennifer A Schwab    MRN: [id number]           Patient Information     Date Of Birth          1980        Visit Information        Provider Department      5/30/2017 4:00 PM Janie Lantigua MD [facility]        Today's Diagnoses     Prenatal care, subsequent pregnancy, third trimester    -  1    Pain and swelling of left lower extremity           Follow-ups after your visit        Your next 10 appointments already scheduled     Jun 06, 2017  2:30 PM CDT   ESTABLISHED PRENATAL with Thong Reed MD   [facility] ([facility])    [address] [address]   [phone]            Jun 13, 2017  4:00 PM CDT   ESTABLISHED PRENATAL with Maria D Yan MD   [facility] ([facility])    [address] [address]   [phone]            Jun 19, 2017  4:30 PM CDT   ESTABLISHED PRENATAL with Janie Lantigua MD   [facility] ([facility])    [address] [address]   [phone]              Future tests that were ordered for you today     Open Future Orders        Priority Expected Expires Ordered    US Lower Extremity Venous Duplex Left STAT  5/30/2018 5/30/2017            Who to contact     If you have questions or need follow up information about today's clinic visit or your schedule please contact [facility] directly at [phone].  Normal or non-critical lab and imaging results will be communicated to you by MyChart, letter or phone within 4 business days after the clinic has received the results. If you do not hear from us within 7 days, please contact the clinic through MyChart or phone. If you have a critical or abnormal lab result, we will notify you by phone as soon as possible.  Submit refill requests through [employer] or call your pharmacy and they will forward the  "refill request to us. Please allow 3 business days for your refill to be completed.          Additional Information About Your Visit        Ry[employer] Information     [employer] gives you secure access to your electronic health record. If you see a primary care provider, you can also send messages to your care team and make appointments. If you have questions, please call your primary care clinic.  If you do not have a primary care provider, please call [phone] and they will assist you.        Care EveryWhere ID     This is your Care EveryWhere ID. This could be used by other organizations to access your [place] medical records  [id number]        Your Vitals Were     Pulse Temperature Height Last Period BMI (Body Mass Index)       105 98.8  F (37.1  C) (Oral) 5' 3\" (1.6 m) 09/19/2016 33.13 kg/m2        Blood Pressure from Last 3 Encounters:   05/30/17 110/77   05/16/17 110/67   05/02/17 103/58    Weight from Last 3 Encounters:   05/30/17 187 lb (84.8 kg)   05/16/17 184 lb 3.2 oz (83.6 kg)   05/02/17 185 lb (83.9 kg)              We Performed the Following     Strep, Group B by PCR        Primary Care Provider Office Phone # Fax #    Janie Lantigua [phone]       [facility] [address] [address]        Thank you!     Thank you for choosing [facility]  for your care. Our goal is always to provide you with excellent care. Hearing back from our patients is one way we can continue to improve our services. Please take a few minutes to complete the written survey that you may receive in the mail after your visit with us. Thank you!             Your Updated Medication List - Protect others around you: Learn how to safely use, store and throw away your medicines at www.disposemymeds.org.          This list is accurate as of: 5/30/17  5:20 PM.  Always use your most recent med list.                   Brand Name Dispense Instructions for use    cetirizine 10 MG " tablet    zyrTEC     Take 10 mg by mouth daily       metoclopramide 5 MG tablet    REGLAN    30 tablet    5-10 mg by mouth every 6 hours as needed for headache       nicotine 7 MG/24HR 24 hr patch    NICODERM CQ    30 patch    Place 1 patch onto the skin every 24 hours       ondansetron 4 MG ODT tab    ZOFRAN ODT    30 tablet    Take 1-2 tablets (4-8 mg) by mouth every 6 hours as needed for nausea       prenatal multivitamin  plus iron 27-0.8 MG Tabs per tablet      Take 1 tablet by mouth daily Reported on 5/2/2017       PREVACID PO      Take 15 mg by mouth every morning (before breakfast)

## 2017-05-30 NOTE — PROGRESS NOTES
"CC: Here for routine prenatal visit @ 36w1d   HPI: + FM, no ctx, no LOF, no VB.  Over the weekend, her left leg became red and swollen (from thigh to toes).  Swelling decreased, but pain has been consistent.   Denies shortness of breath.  Denies any insect bites.     PE: /77 (BP Location: Right arm, Patient Position: Chair, Cuff Size: Adult Large)  Pulse 105  Temp 98.8  F (37.1  C) (Oral)  Ht 5' 3\" (1.6 m)  Wt 187 lb (84.8 kg)  LMP 2016  BMI 33.13 kg/m2   See OB flowsheet    GBS today  Ext: right leg with trace edema and left leg with 1+ edema.  Whole calf is tender of the left lower extremity.  Mild erythema of the left leg without discrete rash    A/P  @ 36w1d normal pregnancy, unilateral leg swelling/pain    1. Routine prenatal care.  Discussed with Jennifer A Schwab, the following; indications; the agents and methods of labor augmentation, including risks, benefits, and alternative approaches; and the possible need for  birth. EFW is AGA.    The Labor Induction:what you need to know information sheet was made available to her. Questions and concerns were addressed and patient agrees to above if necessary during the course of her labor.    2. Leg pain/swelling: plan LE doppler, high suspicion for DVT.     Janie Lantigua M.D.      Addendum: LE doppler negative.  No Clot.   "

## 2017-05-30 NOTE — NURSING NOTE
"Initial /77 (BP Location: Right arm, Patient Position: Chair, Cuff Size: Adult Large)  Pulse 105  Ht 5' 3\" (1.6 m)  Wt 187 lb (84.8 kg)  LMP 09/19/2016  BMI 33.13 kg/m2 Estimated body mass index is 33.13 kg/(m^2) as calculated from the following:    Height as of this encounter: 5' 3\" (1.6 m).    Weight as of this encounter: 187 lb (84.8 kg). .      "

## 2017-05-31 LAB
GP B STREP DNA SPEC QL NAA+PROBE: NORMAL
SPECIMEN SOURCE: NORMAL

## 2017-06-06 ENCOUNTER — PRENATAL OFFICE VISIT (OUTPATIENT)
Dept: OBGYN | Facility: CLINIC | Age: 37
End: 2017-06-06
Payer: COMMERCIAL

## 2017-06-06 VITALS
DIASTOLIC BLOOD PRESSURE: 74 MMHG | BODY MASS INDEX: 32.78 KG/M2 | HEIGHT: 63 IN | WEIGHT: 185 LBS | SYSTOLIC BLOOD PRESSURE: 115 MMHG | HEART RATE: 103 BPM

## 2017-06-06 DIAGNOSIS — Z34.83 PRENATAL CARE, SUBSEQUENT PREGNANCY, THIRD TRIMESTER: Primary | ICD-10-CM

## 2017-06-06 PROCEDURE — 99207 ZZC PRENATAL VISIT: CPT | Performed by: OBSTETRICS & GYNECOLOGY

## 2017-06-06 NOTE — PROGRESS NOTES
"Doing well.   Feels irregular mild-moderate contractions  Denies VB,  LOF. +FM  /74 (BP Location: Right arm, Patient Position: Chair, Cuff Size: Adult Large)  Pulse 103  Ht 5' 3\" (1.6 m)  Wt 185 lb (83.9 kg)  LMP 2016  Breastfeeding? No  BMI 32.77 kg/m2  General Appearance: NAD  Abdomen: Gravid, NT  Refer to flow sheet above.   A/P: 37 year old  at 37w1d  -- Discussed with Jennifer A Schwab, the following; indications; the agents and methods of labor augmentation, including risks, benefits, and alternative approaches; and the possible need for  birth. EFW is AGA. The Labor Induction:what you need to know information sheet was made available to her. Questions and concerns were addressed and patient agrees to above if necessary during the course of her labor.  -- labor precautions reviewed  RTC in 1 week    Thong Reed MD  [facility]            "

## 2017-06-06 NOTE — MR AVS SNAPSHOT
After Visit Summary   6/6/2017    Jennifer A Schwab    MRN: [id number]           Patient Information     Date Of Birth          1980        Visit Information        Provider Department      6/6/2017 2:30 PM Thong Reed MD [facility]        Today's Diagnoses     Prenatal care, subsequent pregnancy, third trimester    -  1       Follow-ups after your visit        Your next 10 appointments already scheduled     Jun 13, 2017  4:00 PM CDT   ESTABLISHED PRENATAL with Maria D Yan MD   [facility] ([facility]    [address] [address]   [phone]            Jun 19, 2017  4:30 PM CDT   ESTABLISHED PRENATAL with Janie Lantigua MD   [facility] ([facility])    [address] [address]   [phone]              Who to contact     If you have questions or need follow up information about today's clinic visit or your schedule please contact [facility] directly at [phone].  Normal or non-critical lab and imaging results will be communicated to you by gogamingohart, letter or phone within 4 business days after the clinic has received the results. If you do not hear from us within 7 days, please contact the clinic through [employer]art or phone. If you have a critical or abnormal lab result, we will notify you by phone as soon as possible.  Submit refill requests through [employer] or call your pharmacy and they will forward the refill request to us. Please allow 3 business days for your refill to be completed.          Additional Information About Your Visit        gogamingohart Information     [employer] gives you secure access to your electronic health record. If you see a primary care provider, you can also send messages to your care team and make appointments. If you have questions, please call your primary care clinic.  If you do not have a primary care provider,  "please call [phone] and they will assist you.        Care EveryWhere ID     This is your Care EveryWhere ID. This could be used by other organizations to access your [place] medical records  [id number]        Your Vitals Were     Pulse Height Last Period Breastfeeding? BMI (Body Mass Index)       103 5' 3\" (1.6 m) 09/19/2016 No 32.77 kg/m2        Blood Pressure from Last 3 Encounters:   06/06/17 115/74   05/30/17 110/77   05/16/17 110/67    Weight from Last 3 Encounters:   06/06/17 185 lb (83.9 kg)   05/30/17 187 lb (84.8 kg)   05/16/17 184 lb 3.2 oz (83.6 kg)              Today, you had the following     No orders found for display       Primary Care Provider Office Phone # Fax #    Janie Lantigua [phone]       [facility] [address] [place] [address]        Thank you!     Thank you for choosing [facility]  for your care. Our goal is always to provide you with excellent care. Hearing back from our patients is one way we can continue to improve our services. Please take a few minutes to complete the written survey that you may receive in the mail after your visit with us. Thank you!             Your Updated Medication List - Protect others around you: Learn how to safely use, store and throw away your medicines at www.disposemymeds.org.          This list is accurate as of: 6/6/17  3:01 PM.  Always use your most recent med list.                   Brand Name Dispense Instructions for use    cetirizine 10 MG tablet    zyrTEC     Take 10 mg by mouth daily       metoclopramide 5 MG tablet    REGLAN    30 tablet    5-10 mg by mouth every 6 hours as needed for headache       nicotine 7 MG/24HR 24 hr patch    NICODERM CQ    30 patch    Place 1 patch onto the skin every 24 hours       ondansetron 4 MG ODT tab    ZOFRAN ODT    30 tablet    Take 1-2 tablets (4-8 mg) by mouth every 6 hours as needed for nausea       prenatal multivitamin  plus iron 27-0.8 MG " Tabs per tablet      Take 1 tablet by mouth daily Reported on 5/2/2017       PREVACID PO      Take 15 mg by mouth every morning (before breakfast)

## 2017-06-06 NOTE — NURSING NOTE
"Initial /74 (BP Location: Right arm, Patient Position: Chair, Cuff Size: Adult Large)  Pulse 103  Ht 5' 3\" (1.6 m)  Wt 185 lb (83.9 kg)  LMP 09/19/2016  Breastfeeding? No  BMI 32.77 kg/m2 Estimated body mass index is 32.77 kg/(m^2) as calculated from the following:    Height as of this encounter: 5' 3\" (1.6 m).    Weight as of this encounter: 185 lb (83.9 kg). .    Narda Garsia    "

## 2017-06-13 ENCOUNTER — PRENATAL OFFICE VISIT (OUTPATIENT)
Dept: OBGYN | Facility: CLINIC | Age: 37
End: 2017-06-13
Payer: COMMERCIAL

## 2017-06-13 VITALS
WEIGHT: 186.6 LBS | HEIGHT: 63 IN | BODY MASS INDEX: 33.06 KG/M2 | DIASTOLIC BLOOD PRESSURE: 78 MMHG | HEART RATE: 93 BPM | SYSTOLIC BLOOD PRESSURE: 122 MMHG

## 2017-06-13 DIAGNOSIS — O09.523 AMA (ADVANCED MATERNAL AGE) MULTIGRAVIDA 35+, THIRD TRIMESTER: ICD-10-CM

## 2017-06-13 DIAGNOSIS — Z34.83 PRENATAL CARE, SUBSEQUENT PREGNANCY, THIRD TRIMESTER: ICD-10-CM

## 2017-06-13 PROCEDURE — 99207 ZZC PRENATAL VISIT: CPT | Performed by: OBSTETRICS & GYNECOLOGY

## 2017-06-13 NOTE — PROGRESS NOTES
Hanging in there.  +FM, had a bout of painful ctx over the weekend, now nothing, no VB or LOF.    37 year old  at 38w1d   - h/o 10 lb baby, this is smaller so no need for IOL for suspected macrosomia at this time  - discussed IOL after 39 if cvx favorable, or at 41 weeks for post dates. She has never gone into spontaneous labor, has alwys been induced.  - AMA- nl verifi    RTC weekly until delivery    Maria D Yan MD, MPH  [facility]

## 2017-06-13 NOTE — NURSING NOTE
"Chief Complaint   Patient presents with     Prenatal Care       Initial /78 (BP Location: Right arm, Patient Position: Chair, Cuff Size: Adult Large)  Pulse 93  Ht 5' 3\" (1.6 m)  Wt 186 lb 9.6 oz (84.6 kg)  LMP 09/19/2016  Breastfeeding? No  BMI 33.05 kg/m2 Estimated body mass index is 33.05 kg/(m^2) as calculated from the following:    Height as of this encounter: 5' 3\" (1.6 m).    Weight as of this encounter: 186 lb 9.6 oz (84.6 kg).  Medication Reconciliation: complete   Sheri Russo CMA      "

## 2017-06-13 NOTE — MR AVS SNAPSHOT
After Visit Summary   6/13/2017    Jennifer A Schwab    MRN: [id number]           Patient Information     Date Of Birth          1980        Visit Information        Provider Department      6/13/2017 4:00 PM Maria D Yan MD [facility]        Today's Diagnoses     AMA (advanced maternal age) multigravida 35+, third trimester        Prenatal care, subsequent pregnancy, third trimester           Follow-ups after your visit        Your next 10 appointments already scheduled     Jun 19, 2017  4:30 PM CDT   ESTABLISHED PRENATAL with Janie Lantigua MD   [facility]    [address] [address]   [phone]              Who to contact     If you have questions or need follow up information about today's clinic visit or your schedule please contact [facility] directly at [phone].  Normal or non-critical lab and imaging results will be communicated to you by MyChart, letter or phone within 4 business days after the clinic has received the results. If you do not hear from us within 7 days, please contact the clinic through [employer] or phone. If you have a critical or abnormal lab result, we will notify you by phone as soon as possible.  Submit refill requests through [employer] or call your pharmacy and they will forward the refill request to us. Please allow 3 business days for your refill to be completed.          Additional Information About Your Visit        MyChart Information     [employer] gives you secure access to your electronic health record. If you see a primary care provider, you can also send messages to your care team and make appointments. If you have questions, please call your primary care clinic.  If you do not have a primary care provider, please call [phone] and they will assist you.        Care EveryWhere ID     This is your Care EveryWhere ID. This could be used by other  "organizations to access your [place] medical records  [id number]        Your Vitals Were     Pulse Height Last Period Breastfeeding? BMI (Body Mass Index)       93 5' 3\" (1.6 m) 09/19/2016 No 33.05 kg/m2        Blood Pressure from Last 3 Encounters:   06/13/17 122/78   06/06/17 115/74   05/30/17 110/77    Weight from Last 3 Encounters:   06/13/17 186 lb 9.6 oz (84.6 kg)   06/06/17 185 lb (83.9 kg)   05/30/17 187 lb (84.8 kg)              Today, you had the following     No orders found for display       Primary Care Provider Office Phone # Fax #    Janie Lantigua [phone]       [facility] [address] [address]        Thank you!     Thank you for choosing [facility]  for your care. Our goal is always to provide you with excellent care. Hearing back from our patients is one way we can continue to improve our services. Please take a few minutes to complete the written survey that you may receive in the mail after your visit with us. Thank you!             Your Updated Medication List - Protect others around you: Learn how to safely use, store and throw away your medicines at www.disposemymeds.org.          This list is accurate as of: 6/13/17  4:37 PM.  Always use your most recent med list.                   Brand Name Dispense Instructions for use    cetirizine 10 MG tablet    zyrTEC     Take 10 mg by mouth daily       metoclopramide 5 MG tablet    REGLAN    30 tablet    5-10 mg by mouth every 6 hours as needed for headache       nicotine 7 MG/24HR 24 hr patch    NICODERM CQ    30 patch    Place 1 patch onto the skin every 24 hours       ondansetron 4 MG ODT tab    ZOFRAN ODT    30 tablet    Take 1-2 tablets (4-8 mg) by mouth every 6 hours as needed for nausea       prenatal multivitamin  plus iron 27-0.8 MG Tabs per tablet      Take 1 tablet by mouth daily Reported on 5/2/2017       PREVACID PO      Take 15 mg by mouth every morning (before " breakfast)

## 2017-06-17 ENCOUNTER — NURSE TRIAGE (OUTPATIENT)
Dept: NURSING | Facility: CLINIC | Age: 37
End: 2017-06-17

## 2017-06-17 PROBLEM — O26.899 PELVIC PRESSURE IN PREGNANCY: Status: ACTIVE | Noted: 2017-06-17

## 2017-06-17 PROBLEM — R10.2 PELVIC PRESSURE IN PREGNANCY: Status: ACTIVE | Noted: 2017-06-17

## 2017-06-17 NOTE — TELEPHONE ENCOUNTER
Pharmacy called back to say the Macrobid is contraindicated in pregnancy.  Called [facility] and spoke with RN Ofe and she reports that Macrobid is the drug they always prescribe.  Called pharmacy back and told the pharmacist it was OK to dispense the Macrobid.

## 2017-06-17 NOTE — TELEPHONE ENCOUNTER
"  Reason for Disposition    [1] Prescription not at pharmacy AND [2] was prescribed today by PCP    Additional Information    Negative: Drug overdose and nurse unable to answer question    Negative: Caller requesting information not related to medicine    Negative: Caller requesting a prescription for Strep throat and has a positive culture result    Negative: Rash while taking a medication or within 3 days of stopping it    Negative: Immunization reaction suspected    Negative: [1] Asthma and [2] having symptoms of asthma (cough, wheezing, etc)    Negative: MORE THAN A DOUBLE DOSE of a prescription or over-the-counter (OTC) drug    Negative: [1] DOUBLE DOSE (an extra dose or lesser amount) of over-the-counter (OTC) drug AND [2] any symptoms (e.g., dizziness, nausea, pain, sleepiness)    Negative: [1] DOUBLE DOSE (an extra dose or lesser amount) of prescription drug AND [2] any symptoms (e.g., dizziness, nausea, pain, sleepiness)    Negative: Took another person's prescription drug    Negative: [1] DOUBLE DOSE (an extra dose or lesser amount) of prescription drug AND [2] NO symptoms (Exception: a double dose of antibiotics)    Negative: Diabetes drug error or overdose (e.g., insulin or extra dose)    Negative: [1] Request for URGENT new prescription or refill of \"essential\" medication (i.e., likelihood of harm to patient if not taken) AND [2] triager unable to fill per unit policy    Answer Assessment - Initial Assessment Questions  1. SYMPTOMS: \"Do you have any symptoms?\"      Pt was seen today and diagnosed with UTI and prescription for Macrobid did not go through.    Called pharmacy and gave the verbal order for the macrobid.    Protocols used: MEDICATION QUESTION CALL-ADULT-    "

## 2017-06-19 ENCOUNTER — PRENATAL OFFICE VISIT (OUTPATIENT)
Dept: OBGYN | Facility: CLINIC | Age: 37
End: 2017-06-19
Payer: COMMERCIAL

## 2017-06-19 ENCOUNTER — ANESTHESIA EVENT (OUTPATIENT)
Dept: OBGYN | Facility: CLINIC | Age: 37
End: 2017-06-19
Payer: COMMERCIAL

## 2017-06-19 ENCOUNTER — HOSPITAL ENCOUNTER (INPATIENT)
Facility: CLINIC | Age: 37
LOS: 2 days | Discharge: HOME | End: 2017-06-21
Attending: OBSTETRICS & GYNECOLOGY | Admitting: OBSTETRICS & GYNECOLOGY
Payer: COMMERCIAL

## 2017-06-19 ENCOUNTER — TELEPHONE (OUTPATIENT)
Dept: OBGYN | Facility: CLINIC | Age: 37
End: 2017-06-19

## 2017-06-19 ENCOUNTER — ANESTHESIA (OUTPATIENT)
Dept: OBGYN | Facility: CLINIC | Age: 37
End: 2017-06-19
Payer: COMMERCIAL

## 2017-06-19 VITALS
HEART RATE: 99 BPM | WEIGHT: 189.4 LBS | DIASTOLIC BLOOD PRESSURE: 84 MMHG | HEIGHT: 63 IN | SYSTOLIC BLOOD PRESSURE: 125 MMHG | BODY MASS INDEX: 33.56 KG/M2

## 2017-06-19 DIAGNOSIS — Z34.83 PRENATAL CARE, SUBSEQUENT PREGNANCY, THIRD TRIMESTER: ICD-10-CM

## 2017-06-19 DIAGNOSIS — O09.523 AMA (ADVANCED MATERNAL AGE) MULTIGRAVIDA 35+, THIRD TRIMESTER: ICD-10-CM

## 2017-06-19 PROBLEM — O42.90 ROM (RUPTURE OF MEMBRANES), PREMATURE: Status: ACTIVE | Noted: 2017-06-19

## 2017-06-19 LAB
A1 MICROGLOB PLACENTAL VAG QL: POSITIVE
HGB BLD-MCNC: 8.5 G/DL (ref 11.7–15.7)

## 2017-06-19 PROCEDURE — 37000011 ZZH ANESTHESIA WARD SERVICE: Performed by: NURSE ANESTHETIST, CERTIFIED REGISTERED

## 2017-06-19 PROCEDURE — 25000128 H RX IP 250 OP 636: Performed by: OBSTETRICS & GYNECOLOGY

## 2017-06-19 PROCEDURE — 40000671 ZZH STATISTIC ANESTHESIA CASE

## 2017-06-19 PROCEDURE — 86901 BLOOD TYPING SEROLOGIC RH(D): CPT | Performed by: OBSTETRICS & GYNECOLOGY

## 2017-06-19 PROCEDURE — 85018 HEMOGLOBIN: CPT | Performed by: OBSTETRICS & GYNECOLOGY

## 2017-06-19 PROCEDURE — 86900 BLOOD TYPING SEROLOGIC ABO: CPT | Performed by: OBSTETRICS & GYNECOLOGY

## 2017-06-19 PROCEDURE — 00HU33Z INSERTION OF INFUSION DEVICE INTO SPINAL CANAL, PERCUTANEOUS APPROACH: ICD-10-PCS | Performed by: OBSTETRICS & GYNECOLOGY

## 2017-06-19 PROCEDURE — 25000128 H RX IP 250 OP 636: Performed by: NURSE ANESTHETIST, CERTIFIED REGISTERED

## 2017-06-19 PROCEDURE — 99207 ZZC PRENATAL VISIT: CPT | Performed by: OBSTETRICS & GYNECOLOGY

## 2017-06-19 PROCEDURE — 25000125 ZZHC RX 250: Performed by: NURSE ANESTHETIST, CERTIFIED REGISTERED

## 2017-06-19 PROCEDURE — 84112 EVAL AMNIOTIC FLUID PROTEIN: CPT | Performed by: OBSTETRICS & GYNECOLOGY

## 2017-06-19 PROCEDURE — 25000125 ZZHC RX 250: Performed by: OBSTETRICS & GYNECOLOGY

## 2017-06-19 PROCEDURE — 86780 TREPONEMA PALLIDUM: CPT | Performed by: OBSTETRICS & GYNECOLOGY

## 2017-06-19 PROCEDURE — 3E0R3CZ INTRODUCTION OF REGIONAL ANESTHETIC INTO SPINAL CANAL, PERCUTANEOUS APPROACH: ICD-10-PCS | Performed by: OBSTETRICS & GYNECOLOGY

## 2017-06-19 PROCEDURE — 12000027 ZZH R&B OB

## 2017-06-19 RX ORDER — NALOXONE HYDROCHLORIDE 0.4 MG/ML
.1-.4 INJECTION, SOLUTION INTRAMUSCULAR; INTRAVENOUS; SUBCUTANEOUS
Status: DISCONTINUED | OUTPATIENT
Start: 2017-06-19 | End: 2017-06-20

## 2017-06-19 RX ORDER — ONDANSETRON 2 MG/ML
4 INJECTION INTRAMUSCULAR; INTRAVENOUS EVERY 6 HOURS PRN
Status: CANCELLED | OUTPATIENT
Start: 2017-06-19

## 2017-06-19 RX ORDER — LIDOCAINE 40 MG/G
CREAM TOPICAL
Status: CANCELLED | OUTPATIENT
Start: 2017-06-19

## 2017-06-19 RX ORDER — IBUPROFEN 800 MG/1
800 TABLET, FILM COATED ORAL
Status: CANCELLED | OUTPATIENT
Start: 2017-06-19

## 2017-06-19 RX ORDER — ONDANSETRON 4 MG/1
4 TABLET, ORALLY DISINTEGRATING ORAL EVERY 6 HOURS PRN
Status: DISCONTINUED | OUTPATIENT
Start: 2017-06-19 | End: 2017-06-20

## 2017-06-19 RX ORDER — ROPIVACAINE HYDROCHLORIDE 2 MG/ML
INJECTION, SOLUTION EPIDURAL; INFILTRATION; PERINEURAL
Status: DISCONTINUED
Start: 2017-06-19 | End: 2017-06-20 | Stop reason: HOSPADM

## 2017-06-19 RX ORDER — ONDANSETRON 2 MG/ML
4 INJECTION INTRAMUSCULAR; INTRAVENOUS EVERY 6 HOURS PRN
Status: DISCONTINUED | OUTPATIENT
Start: 2017-06-19 | End: 2017-06-20

## 2017-06-19 RX ORDER — EPHEDRINE SULFATE 50 MG/ML
5 INJECTION, SOLUTION INTRAMUSCULAR; INTRAVENOUS; SUBCUTANEOUS
Status: DISCONTINUED | OUTPATIENT
Start: 2017-06-19 | End: 2017-06-20

## 2017-06-19 RX ORDER — OXYTOCIN 10 [USP'U]/ML
10 INJECTION, SOLUTION INTRAMUSCULAR; INTRAVENOUS
Status: CANCELLED | OUTPATIENT
Start: 2017-06-19

## 2017-06-19 RX ORDER — OXYTOCIN/0.9 % SODIUM CHLORIDE 30/500 ML
100-340 PLASTIC BAG, INJECTION (ML) INTRAVENOUS CONTINUOUS PRN
Status: CANCELLED | OUTPATIENT
Start: 2017-06-19

## 2017-06-19 RX ORDER — LIDOCAINE HYDROCHLORIDE 10 MG/ML
INJECTION, SOLUTION EPIDURAL; INFILTRATION; INTRACAUDAL; PERINEURAL
Status: DISCONTINUED
Start: 2017-06-19 | End: 2017-06-20 | Stop reason: HOSPADM

## 2017-06-19 RX ORDER — NALOXONE HYDROCHLORIDE 0.4 MG/ML
.1-.4 INJECTION, SOLUTION INTRAMUSCULAR; INTRAVENOUS; SUBCUTANEOUS
Status: CANCELLED | OUTPATIENT
Start: 2017-06-19

## 2017-06-19 RX ORDER — OXYTOCIN 10 [USP'U]/ML
10 INJECTION, SOLUTION INTRAMUSCULAR; INTRAVENOUS
Status: DISCONTINUED | OUTPATIENT
Start: 2017-06-19 | End: 2017-06-20

## 2017-06-19 RX ORDER — LIDOCAINE 40 MG/G
CREAM TOPICAL
Status: DISCONTINUED | OUTPATIENT
Start: 2017-06-19 | End: 2017-06-20

## 2017-06-19 RX ORDER — METHYLERGONOVINE MALEATE 0.2 MG/ML
200 INJECTION INTRAVENOUS
Status: DISCONTINUED | OUTPATIENT
Start: 2017-06-19 | End: 2017-06-20

## 2017-06-19 RX ORDER — OXYCODONE AND ACETAMINOPHEN 5; 325 MG/1; MG/1
1 TABLET ORAL
Status: CANCELLED | OUTPATIENT
Start: 2017-06-19

## 2017-06-19 RX ORDER — DIPHENHYDRAMINE HYDROCHLORIDE 50 MG/ML
12.5-25 INJECTION INTRAMUSCULAR; INTRAVENOUS EVERY 4 HOURS PRN
Status: DISCONTINUED | OUTPATIENT
Start: 2017-06-19 | End: 2017-06-20

## 2017-06-19 RX ORDER — CARBOPROST TROMETHAMINE 250 UG/ML
250 INJECTION, SOLUTION INTRAMUSCULAR
Status: DISCONTINUED | OUTPATIENT
Start: 2017-06-19 | End: 2017-06-20

## 2017-06-19 RX ORDER — CARBOPROST TROMETHAMINE 250 UG/ML
250 INJECTION, SOLUTION INTRAMUSCULAR
Status: CANCELLED | OUTPATIENT
Start: 2017-06-19

## 2017-06-19 RX ORDER — FENTANYL CITRATE 50 UG/ML
INJECTION, SOLUTION INTRAMUSCULAR; INTRAVENOUS
Status: DISCONTINUED
Start: 2017-06-19 | End: 2017-06-20 | Stop reason: HOSPADM

## 2017-06-19 RX ORDER — OXYTOCIN/0.9 % SODIUM CHLORIDE 30/500 ML
100-340 PLASTIC BAG, INJECTION (ML) INTRAVENOUS CONTINUOUS PRN
Status: DISCONTINUED | OUTPATIENT
Start: 2017-06-19 | End: 2017-06-20

## 2017-06-19 RX ORDER — SODIUM CHLORIDE, SODIUM LACTATE, POTASSIUM CHLORIDE, CALCIUM CHLORIDE 600; 310; 30; 20 MG/100ML; MG/100ML; MG/100ML; MG/100ML
INJECTION, SOLUTION INTRAVENOUS CONTINUOUS
Status: CANCELLED | OUTPATIENT
Start: 2017-06-19

## 2017-06-19 RX ORDER — IBUPROFEN 800 MG/1
800 TABLET, FILM COATED ORAL
Status: COMPLETED | OUTPATIENT
Start: 2017-06-19 | End: 2017-06-20

## 2017-06-19 RX ORDER — OXYTOCIN/0.9 % SODIUM CHLORIDE 30/500 ML
1-24 PLASTIC BAG, INJECTION (ML) INTRAVENOUS CONTINUOUS
Status: DISCONTINUED | OUTPATIENT
Start: 2017-06-19 | End: 2017-06-20

## 2017-06-19 RX ORDER — ACETAMINOPHEN 325 MG/1
650 TABLET ORAL EVERY 4 HOURS PRN
Status: CANCELLED | OUTPATIENT
Start: 2017-06-19

## 2017-06-19 RX ORDER — EPHEDRINE SULFATE 50 MG/ML
INJECTION, SOLUTION INTRAMUSCULAR; INTRAVENOUS; SUBCUTANEOUS
Status: DISCONTINUED
Start: 2017-06-19 | End: 2017-06-20 | Stop reason: WASHOUT

## 2017-06-19 RX ORDER — SODIUM CHLORIDE, SODIUM LACTATE, POTASSIUM CHLORIDE, CALCIUM CHLORIDE 600; 310; 30; 20 MG/100ML; MG/100ML; MG/100ML; MG/100ML
INJECTION, SOLUTION INTRAVENOUS CONTINUOUS
Status: DISCONTINUED | OUTPATIENT
Start: 2017-06-19 | End: 2017-06-20

## 2017-06-19 RX ORDER — ACETAMINOPHEN 325 MG/1
650 TABLET ORAL EVERY 4 HOURS PRN
Status: DISCONTINUED | OUTPATIENT
Start: 2017-06-19 | End: 2017-06-20

## 2017-06-19 RX ORDER — OXYCODONE AND ACETAMINOPHEN 5; 325 MG/1; MG/1
1 TABLET ORAL
Status: DISCONTINUED | OUTPATIENT
Start: 2017-06-19 | End: 2017-06-20

## 2017-06-19 RX ORDER — METHYLERGONOVINE MALEATE 0.2 MG/ML
200 INJECTION INTRAVENOUS
Status: CANCELLED | OUTPATIENT
Start: 2017-06-19

## 2017-06-19 RX ORDER — OXYTOCIN/0.9 % SODIUM CHLORIDE 30/500 ML
1-24 PLASTIC BAG, INJECTION (ML) INTRAVENOUS CONTINUOUS
Status: CANCELLED | OUTPATIENT
Start: 2017-06-19

## 2017-06-19 RX ADMIN — LIDOCAINE HYDROCHLORIDE,EPINEPHRINE BITARTRATE 3 ML: 15; .005 INJECTION, SOLUTION EPIDURAL; INFILTRATION; INTRACAUDAL; PERINEURAL at 23:56

## 2017-06-19 RX ADMIN — SODIUM BICARBONATE 1 MEQ: 84 INJECTION, SOLUTION INTRAVENOUS at 23:40

## 2017-06-19 RX ADMIN — LIDOCAINE HYDROCHLORIDE 9 ML: 10 INJECTION, SOLUTION INFILTRATION; PERINEURAL at 23:40

## 2017-06-19 RX ADMIN — SODIUM CHLORIDE, POTASSIUM CHLORIDE, SODIUM LACTATE AND CALCIUM CHLORIDE: 600; 310; 30; 20 INJECTION, SOLUTION INTRAVENOUS at 20:47

## 2017-06-19 RX ADMIN — OXYTOCIN-SODIUM CHLORIDE 0.9% IV SOLN 30 UNIT/500ML 2 MILLI-UNITS/MIN: 30-0.9/5 SOLUTION at 20:47

## 2017-06-19 NOTE — MR AVS SNAPSHOT
"              After Visit Summary   6/19/2017    Jennifer A Schwab    MRN: [id number]           Patient Information     Date Of Birth          1980        Visit Information        Provider Department      6/19/2017 4:30 PM Janie Lantigua MD [facility]        Today's Diagnoses     Prenatal care, subsequent pregnancy, third trimester        AMA (advanced maternal age) multigravida 35+, third trimester           Follow-ups after your visit        Who to contact     If you have questions or need follow up information about today's clinic visit or your schedule please contact [facility] directly at [phone].  Normal or non-critical lab and imaging results will be communicated to you by [employer], letter or phone within 4 business days after the clinic has received the results. If you do not hear from us within 7 days, please contact the clinic through [employer] or phone. If you have a critical or abnormal lab result, we will notify you by phone as soon as possible.  Submit refill requests through [employer] or call your pharmacy and they will forward the refill request to us. Please allow 3 business days for your refill to be completed.          Additional Information About Your Visit        MyChart Information     [employer] gives you secure access to your electronic health record. If you see a primary care provider, you can also send messages to your care team and make appointments. If you have questions, please call your primary care clinic.  If you do not have a primary care provider, please call [phone] and they will assist you.        Care EveryWhere ID     This is your Care EveryWhere ID. This could be used by other organizations to access your [place] medical records  [id number]        Your Vitals Were     Pulse Height Last Period Breastfeeding? BMI (Body Mass Index)       99 5' 3\" (1.6 m) 09/19/2016 No 33.55 kg/m2        Blood Pressure from Last 3 Encounters:   06/19/17 " 125/84   06/17/17 111/79   06/13/17 122/78    Weight from Last 3 Encounters:   06/19/17 189 lb 6.4 oz (85.9 kg)   06/17/17 180 lb (81.6 kg)   06/13/17 186 lb 9.6 oz (84.6 kg)              We Performed the Following     Rupture of membranes by Amnisure        Primary Care Provider Office Phone # Fax #    Janie Lantigua [phone]       [facility] [address]  [facility] [address]        Thank you!     Thank you for choosing [facility]  for your care. Our goal is always to provide you with excellent care. Hearing back from our patients is one way we can continue to improve our services. Please take a few minutes to complete the written survey that you may receive in the mail after your visit with us. Thank you!             Your Updated Medication List - Protect others around you: Learn how to safely use, store and throw away your medicines at www.disposemymeds.org.          This list is accurate as of: 6/19/17  5:18 PM.  Always use your most recent med list.                   Brand Name Dispense Instructions for use    cetirizine 10 MG tablet    zyrTEC     Take 10 mg by mouth daily       nicotine 7 MG/24HR 24 hr patch    NICODERM CQ    30 patch    Place 1 patch onto the skin every 24 hours       nitrofurantoin (macrocrystal-monohydrate) 100 MG capsule    MACROBID    14 capsule    Take 1 capsule (100 mg) by mouth 2 times daily       ondansetron 4 MG ODT tab    ZOFRAN ODT    30 tablet    Take 1-2 tablets (4-8 mg) by mouth every 6 hours as needed for nausea       prenatal multivitamin  plus iron 27-0.8 MG Tabs per tablet      Take 1 tablet by mouth daily Reported on 5/2/2017       PREVACID PO      Take 15 mg by mouth every morning (before breakfast)

## 2017-06-19 NOTE — IP AVS SNAPSHOT
MRN:[id number]                      After Visit Summary   6/19/2017    Jennifer A Schwab    MRN: [id number]           Thank you!     Thank you for choosing [place] for your care. Our goal is always to provide you with excellent care. Hearing back from our patients is one way we can continue to improve our services. Please take a few minutes to complete the written survey that you may receive in the mail after you visit with us. Thank you!        Patient Information     Date Of Birth          1980        Designated Caregiver       Most Recent Value    Caregiver    Will someone help with your care after discharge? no      About your hospital stay     You were admitted on:  June 19, 2017 You last received care in the:  [facility]    You were discharged on:  June 21, 2017       Who to Call     For medical emergencies, please call 911.  For non-urgent questions about your medical care, please call your primary care provider or clinic, [phone]          Attending Provider     Provider Specialty    Janie Lantigua MD OB/Gyn       Primary Care Provider Office Phone # Fax #    Janie Lantigua [phone]      Further instructions from your care team            Discharge Instructions and Follow-Up:   Discharge diet: Regular   Discharge activity: Pelvic rest: abstain from intercourse and do not use tampons for 6 week(s)   Discharge follow-up: Follow up with Dr. lantigua in 6 weeks   Wound care: Drink plenty of fluids  Ice to area for comfort     Postpartum Vaginal Delivery Instructions    Activity       Ask family and friends for help when you need it.    Do not place anything in your vagina for 6 weeks.    You are not restricted on other activities, but take it easy for a few weeks to allow your body to recover from delivery.  You are able to do any activities you feel up to that point.    No driving until you have stopped taking your pain medications (usually two  weeks after delivery).     Call your health care provider if you have any of these symptoms:       Increased pain, swelling, redness, or fluid around your stiches from an episiotomy or perineal tear.    A fever above 100.4 F (38 C) with or without chills when placing a thermometer under your tongue.    You soak a sanitary pad with blood within 1 hour, or you see blood clots larger than a golf ball.    Bleeding that lasts more than 6 weeks.    Vaginal discharge that smells bad.    Severe pain, cramping or tenderness in your lower belly area.    A need to urinate more frequently (use the toilet more often), more urgently (use the toilet very quickly), or it burns when you urinate.    Nausea and vomiting.    Redness, swelling or pain around a vein in your leg.    Problems breastfeeding or a red or painful area on your breast.    Chest pain and cough or are gasping for air.    Problems coping with sadness, anxiety, or depression.  If you have any concerns about hurting yourself or the baby, call your provider immediately.     You have questions or concerns after you return home.     Keep your hands clean:  Always wash your hands before touching your perineal area and stitches.  This helps reduce your risk of infection.  If your hands aren't dirty, you may use an alcohol hand-rub to clean your hands. Keep your nails clean and short.        Pending Results     No orders found from 6/17/2017 to 6/20/2017.            Statement of Approval     Ordered          06/21/17 0939  I have reviewed and agree with all the recommendations and orders detailed in this document.  EFFECTIVE NOW     Approved and electronically signed by:  Damion Callahan MD             Admission Information     Date & Time Provider Department Dept. Phone    6/19/2017 Janie Lantigua MD [facility] [phone]      Your Vitals Were     Blood Pressure Pulse Temperature Respirations Last Period Pulse Oximetry    125/70 71 97.9  F (36.6   C) (Oral) 18 09/19/2016 98%      [employer] Information     [employer] gives you secure access to your electronic health record. If you see a primary care provider, you can also send messages to your care team and make appointments. If you have questions, please call your primary care clinic.  If you do not have a primary care provider, please call [phone] and they will assist you.        Care EveryWhere ID     This is your Care EveryWhere ID. This could be used by other organizations to access your [place] medical records  [id number]        Equal Access to Services     [facility]: Hadii parviz Nye, waangel ojeda, reilly kraftalpavel connor, oanh ely . So [facility] [phone].    ATENCIÓN: Si habla español, tiene a allen disposición servicios gratuitos de asistencia lingüística. Llame al [phone].    We comply with applicable federal civil rights laws and [place] laws. We do not discriminate on the basis of race, color, national origin, age, disability sex, sexual orientation or gender identity.               Review of your medicines      START taking        Dose / Directions    ibuprofen 800 MG tablet   Commonly known as:  ADVIL/MOTRIN        Dose:  800 mg   Take 1 tablet (800 mg) by mouth every 8 hours as needed for moderate pain   Quantity:  90 tablet   Refills:  1         CONTINUE these medicines which have NOT CHANGED        Dose / Directions    cetirizine 10 MG tablet   Commonly known as:  zyrTEC        Dose:  10 mg   Take 10 mg by mouth every evening   Refills:  0       nitrofurantoin (macrocrystal-monohydrate) 100 MG capsule   Commonly known as:  MACROBID   Used for:  Acute cystitis without hematuria        Dose:  100 mg   Take 1 capsule (100 mg) by mouth 2 times daily   Quantity:  14 capsule   Refills:  0       prenatal multivitamin  plus iron 27-0.8 MG Tabs per tablet        Dose:  1 tablet   Take 1 tablet by mouth daily Reported on 5/2/2017   Refills:  0        PREVACID PO        Dose:  15 mg   Take 15 mg by mouth every morning (before breakfast)   Refills:  0            Where to get your medicines      These medications [place] [facility] [place] [address]  [address] [address]     Phone:  [phone]     ibuprofen 800 MG tablet                Protect others around you: Learn how to safely use, store and throw away your medicines at www.disposemymeds.org.             Medication List: This is a list of all your medications and when to take them. Check marks below indicate your daily home schedule. Keep this list as a reference.      Medications           Morning Afternoon Evening Bedtime As Needed    cetirizine 10 MG tablet   Commonly known as:  zyrTEC   Take 10 mg by mouth every evening                    Next dose due   tonight               ibuprofen 800 MG tablet   Commonly known as:  ADVIL/MOTRIN   Take 1 tablet (800 mg) by mouth every 8 hours as needed for moderate pain   Last time this was given:  800 mg on 6/21/2017  8:12 AM                                   nitrofurantoin (macrocrystal-monohydrate) 100 MG capsule   Commonly known as:  MACROBID   Take 1 capsule (100 mg) by mouth 2 times daily   Last time this was given:  100 mg on 6/21/2017  6:45 AM                    Next dose due   tonight               prenatal multivitamin  plus iron 27-0.8 MG Tabs per tablet   Take 1 tablet by mouth daily Reported on 5/2/2017   Last time this was given:  1 tablet on 6/21/2017  8:12 AM            Next dose due   tomorrow                       PREVACID PO   Take 15 mg by mouth every morning (before breakfast)   Last time this was given:  15 mg on 6/21/2017  6:45 AM            Next dose due   tomorrow

## 2017-06-19 NOTE — PROGRESS NOTES
"CC: Here for routine prenatal visit @ 39w0d   HPI: + FM, no ctx, ?LOF, no VB.  No complaints.     PE: /84 (BP Location: Left arm, Patient Position: Chair, Cuff Size: Adult Regular)  Pulse 99  Ht 5' 3\" (1.6 m)  Wt 189 lb 6.4 oz (85.9 kg)  LMP 2016  Breastfeeding? No  BMI 33.55 kg/m2   See OB flowsheet    amnisure positive    A/P  @ 39w0d normal pregnancy, SROM    1. [facility].  Discussed with Jennifer A Schwab, the following; indications; the agents and methods of labor augmentation, including risks, benefits, and alternative approaches; and the possible need for  birth. EFW is AGA.    The Labor Induction:what you need to know information sheet was made available to her. Questions and concerns were addressed and patient agrees to above if necessary during the course of her labor.    Janie Lantigua MD       "

## 2017-06-19 NOTE — TELEPHONE ENCOUNTER
Pt called and her phone call got transferred to the [facility] when she was trying to change her appointment and get into see Dr Lantigua sooner than 4:30 today.  Reported that she lost her mucous plug yesterday and it freaked her out.  It has not happened in her previous 2 pregnancies.  She was seen on Friday here and ruled out for rupture.  And was diagnosed with a UTI.  Taking RX for it.  She continues to have occasional leaking of clear fluid.  Increased pressure.  And clusters of contractions that occasionallly cause her to stop and breath thru them.  Was told that she could come to the [facility] any time that she wants if any of this symptoms worsens or to rule out rupture again.  Gave her the phone number.  Encouraged fluids and all 4's position and to call before she comes to [facility].  Pt is at work and was thinking she might go home and hang out before her appointment.

## 2017-06-19 NOTE — TELEPHONE ENCOUNTER
Inform patient she can come earlier to her appointment if she is still symptomatic    Janie Lantigua M.D.

## 2017-06-19 NOTE — NURSING NOTE
"Chief Complaint   Patient presents with     Prenatal Care       Initial /84 (BP Location: Left arm, Patient Position: Chair, Cuff Size: Adult Regular)  Pulse 99  Ht 5' 3\" (1.6 m)  Wt 189 lb 6.4 oz (85.9 kg)  LMP 09/19/2016  Breastfeeding? No  BMI 33.55 kg/m2 Estimated body mass index is 33.55 kg/(m^2) as calculated from the following:    Height as of this encounter: 5' 3\" (1.6 m).    Weight as of this encounter: 189 lb 6.4 oz (85.9 kg).  Medication Reconciliation: complete   Sheri Russo CMA      "

## 2017-06-19 NOTE — IP AVS SNAPSHOT
[facility]    [address] [address]    Phone:  [phone]    Fax:  [phone]                                       After Visit Summary   6/19/2017    Jennifer A Schwab    MRN: [id number]           After Visit Summary Signature Page     I have received my discharge instructions, and my questions have been answered. I have discussed any challenges I see with this plan with the nurse or doctor.    ..........................................................................................................................................  Patient/Patient Representative Signature      ..........................................................................................................................................  Patient Representative Print Name and Relationship to Patient    ..................................................               ................................................  Date                                            Time    ..........................................................................................................................................  Reviewed by Signature/Title    ...................................................              ..............................................  Date                                                            Time

## 2017-06-19 NOTE — H&P
[facility] Labor and Delivery History and Physical    Jennifer A Schwab MRN# [id number]   Age: 37 year old YOB: 1980     Date of Admission:  2017     Primary care provider: Janie Lantigua           Chief Complaint:   Jennifer A Schwab is a 37 year old female who is 39w0d pregnant and being admitted for SROM.  Positive amnisure in the office.  [facility].  Unsure how long she has been ruptured.  Lost her mucous plug yesterday.          Pregnancy history:     OBSTETRIC HISTORY:    Obstetric History       T2      L2     SAB0   TAB0   Ectopic0   Multiple0   Live Births0       # Outcome Date GA Lbr Tacho/2nd Weight Sex Delivery Anes PTL Lv   3 Current            2 Term 12/02/10 42w0d  10 lb 2 oz (4.593 kg) F Vag-Spont   KEVIN      Name: Eleanor      Complications: Preeclampsia in postpartum period   1 Term 98 40w0d  7 lb 12 oz (3.515 kg) M    KEVIN      Name: Emerson          EDC: Estimated Date of Delivery: 17    Prenatal Labs:   Lab Results   Component Value Date    ABO O 2016    RH  Pos 2016    AS Neg 2016    HEPBANG Nonreactive 2016    TREPAB Negative 2016    HGB 10.0 (L) 2017       GBS Status:   Lab Results   Component Value Date    GBS  2017     Negative  No GBS DNA detected, presumed negative for GBS or number of bacteria may be   below the limit of detection of the assay.   Assay performed on incubated broth culture of specimen using Kony real-time   PCR.         Active Problem List  Patient Active Problem List   Diagnosis     Prenatal care, subsequent pregnancy, third trimester     Smoker     Cervical high risk HPV (human papillomavirus) test positive     AMA (advanced maternal age) multigravida 35+     Pain and swelling of left lower extremity     Pelvic pressure in pregnancy       Medication Prior to Admission  No prescriptions prior to admission.   .        Maternal Past Medical  History:     Past Medical History:   Diagnosis Date     Cervical high risk HPV (human papillomavirus) test positive 11/17/2016     Intussusception intestine (H) 2013     Preeclampsia in postpartum period 2010    1 week after 2nd delivery                       Family History:     Family History   Problem Relation Age of Onset     Down Syndrome Other      husbands 1/2 brother     GASTROINTESTINAL DISEASE Daughter      severe constipation     Unknown/Adopted Father      unknown family history     Breast Cancer Maternal Grandmother      DIABETES Mother      type II before gastric bypass     Other Cancer Mother      Family history reviewed and updated in [facility]            Social History:     Social History   Substance Use Topics     Smoking status: Current Every Day Smoker     Packs/day: 0.25     Smokeless tobacco: Not on file      Comment: smoking 20cig/day- down to 5 cig/day with pregnancy     Alcohol use 0.0 oz/week     0 Standard drinks or equivalent per week      Comment: rare- quit with pregnancy            Review of Systems:   The Review of Systems is negative other than noted in the HPI          Physical Exam:   Vitals were reviewed  Constitutional:   awake, alert, cooperative, no apparent distress, and appears stated age     Lungs:   No increased work of breathing, good air exchange, clear to auscultation bilaterally, no crackles or wheezing     Cardiovascular:   normal S1 and S2      Ext: left leg edema > right leg edema (prior duplex negative)  Cervix:   Membranes: SROM   Dilation: 2   Effacement: 50%   Station:-3   Consistency: average   Position: Anterior  Presentation:Cephalic                       Assessment:   Jennifer A Schwab is a 39w0d pregnant female admitted with SROM.          Plan:   Admit - see IP orders  Pain medication : per request  Labor augmentation with Pitocin.  Discussed with Jennifer A Schwab, the following; indications; the agents and methods of labor augmentation, including risks,  benefits, and alternative approaches; and the possible need for  birth. EFW is AGA.    The Labor Induction:what you need to know information sheet was made available to her. Questions and concerns were addressed and patient agrees to above if necessary during the course of her labor.    Janie Lantigua MD

## 2017-06-20 LAB
ABO + RH BLD: NORMAL
ABO + RH BLD: NORMAL
SPECIMEN EXP DATE BLD: NORMAL
T PALLIDUM IGG+IGM SER QL: NEGATIVE

## 2017-06-20 PROCEDURE — 25000132 ZZH RX MED GY IP 250 OP 250 PS 637: Performed by: OBSTETRICS & GYNECOLOGY

## 2017-06-20 PROCEDURE — 25000125 ZZHC RX 250: Performed by: NURSE ANESTHETIST, CERTIFIED REGISTERED

## 2017-06-20 PROCEDURE — 25000128 H RX IP 250 OP 636: Performed by: NURSE ANESTHETIST, CERTIFIED REGISTERED

## 2017-06-20 PROCEDURE — 12000027 ZZH R&B OB

## 2017-06-20 PROCEDURE — 72200001 ZZH LABOR CARE VAGINAL DELIVERY SINGLE

## 2017-06-20 PROCEDURE — 25000128 H RX IP 250 OP 636: Performed by: OBSTETRICS & GYNECOLOGY

## 2017-06-20 PROCEDURE — 59400 OBSTETRICAL CARE: CPT | Performed by: OBSTETRICS & GYNECOLOGY

## 2017-06-20 RX ORDER — MECOBALAMIN 5000 MCG
15 TABLET,DISINTEGRATING ORAL
Status: DISCONTINUED | OUTPATIENT
Start: 2017-06-21 | End: 2017-06-20 | Stop reason: CLARIF

## 2017-06-20 RX ORDER — LIDOCAINE HYDROCHLORIDE 10 MG/ML
INJECTION, SOLUTION INFILTRATION; PERINEURAL PRN
Status: DISCONTINUED | OUTPATIENT
Start: 2017-06-19 | End: 2017-06-21

## 2017-06-20 RX ORDER — AMOXICILLIN 250 MG
1-2 CAPSULE ORAL 2 TIMES DAILY
Status: DISCONTINUED | OUTPATIENT
Start: 2017-06-20 | End: 2017-06-21 | Stop reason: HOSPADM

## 2017-06-20 RX ORDER — OXYTOCIN/0.9 % SODIUM CHLORIDE 30/500 ML
100 PLASTIC BAG, INJECTION (ML) INTRAVENOUS CONTINUOUS
Status: DISCONTINUED | OUTPATIENT
Start: 2017-06-20 | End: 2017-06-21 | Stop reason: HOSPADM

## 2017-06-20 RX ORDER — LIDOCAINE HYDROCHLORIDE AND EPINEPHRINE 15; 5 MG/ML; UG/ML
INJECTION, SOLUTION EPIDURAL PRN
Status: DISCONTINUED | OUTPATIENT
Start: 2017-06-19 | End: 2017-06-21

## 2017-06-20 RX ORDER — PANTOPRAZOLE SODIUM 20 MG/1
20 TABLET, DELAYED RELEASE ORAL EVERY MORNING
Status: DISCONTINUED | OUTPATIENT
Start: 2017-06-21 | End: 2017-06-20 | Stop reason: CLARIF

## 2017-06-20 RX ORDER — NITROFURANTOIN 25; 75 MG/1; MG/1
100 CAPSULE ORAL 2 TIMES DAILY
Status: DISCONTINUED | OUTPATIENT
Start: 2017-06-20 | End: 2017-06-21 | Stop reason: HOSPADM

## 2017-06-20 RX ORDER — PRENATAL VIT/IRON FUM/FOLIC AC 27MG-0.8MG
1 TABLET ORAL DAILY
Status: DISCONTINUED | OUTPATIENT
Start: 2017-06-20 | End: 2017-06-21 | Stop reason: HOSPADM

## 2017-06-20 RX ORDER — CETIRIZINE HYDROCHLORIDE 10 MG/1
10 TABLET ORAL EVERY EVENING
Status: DISCONTINUED | OUTPATIENT
Start: 2017-06-20 | End: 2017-06-21 | Stop reason: HOSPADM

## 2017-06-20 RX ORDER — OXYCODONE HYDROCHLORIDE 5 MG/1
5-10 TABLET ORAL
Status: DISCONTINUED | OUTPATIENT
Start: 2017-06-20 | End: 2017-06-21 | Stop reason: HOSPADM

## 2017-06-20 RX ORDER — LANOLIN 100 %
OINTMENT (GRAM) TOPICAL
Status: DISCONTINUED | OUTPATIENT
Start: 2017-06-20 | End: 2017-06-21 | Stop reason: HOSPADM

## 2017-06-20 RX ORDER — HYDROCORTISONE 2.5 %
CREAM (GRAM) TOPICAL 3 TIMES DAILY PRN
Status: DISCONTINUED | OUTPATIENT
Start: 2017-06-20 | End: 2017-06-21 | Stop reason: HOSPADM

## 2017-06-20 RX ORDER — NALOXONE HYDROCHLORIDE 0.4 MG/ML
.1-.4 INJECTION, SOLUTION INTRAMUSCULAR; INTRAVENOUS; SUBCUTANEOUS
Status: DISCONTINUED | OUTPATIENT
Start: 2017-06-20 | End: 2017-06-21 | Stop reason: HOSPADM

## 2017-06-20 RX ORDER — ROPIVACAINE HYDROCHLORIDE 2 MG/ML
INJECTION, SOLUTION EPIDURAL; INFILTRATION; PERINEURAL PRN
Status: DISCONTINUED | OUTPATIENT
Start: 2017-06-20 | End: 2017-06-21

## 2017-06-20 RX ORDER — IBUPROFEN 400 MG/1
400-800 TABLET, FILM COATED ORAL EVERY 6 HOURS PRN
Status: DISCONTINUED | OUTPATIENT
Start: 2017-06-20 | End: 2017-06-21 | Stop reason: HOSPADM

## 2017-06-20 RX ORDER — MISOPROSTOL 200 UG/1
400 TABLET ORAL
Status: DISCONTINUED | OUTPATIENT
Start: 2017-06-20 | End: 2017-06-21 | Stop reason: HOSPADM

## 2017-06-20 RX ORDER — OXYTOCIN/0.9 % SODIUM CHLORIDE 30/500 ML
340 PLASTIC BAG, INJECTION (ML) INTRAVENOUS CONTINUOUS PRN
Status: DISCONTINUED | OUTPATIENT
Start: 2017-06-20 | End: 2017-06-21 | Stop reason: HOSPADM

## 2017-06-20 RX ORDER — MECOBALAMIN 5000 MCG
15 TABLET,DISINTEGRATING ORAL
Status: DISCONTINUED | OUTPATIENT
Start: 2017-06-20 | End: 2017-06-21 | Stop reason: HOSPADM

## 2017-06-20 RX ORDER — FENTANYL CITRATE 50 UG/ML
INJECTION, SOLUTION INTRAMUSCULAR; INTRAVENOUS PRN
Status: DISCONTINUED | OUTPATIENT
Start: 2017-06-20 | End: 2017-06-21

## 2017-06-20 RX ORDER — ACETAMINOPHEN 325 MG/1
650 TABLET ORAL EVERY 4 HOURS PRN
Status: DISCONTINUED | OUTPATIENT
Start: 2017-06-20 | End: 2017-06-21 | Stop reason: HOSPADM

## 2017-06-20 RX ORDER — BISACODYL 10 MG
10 SUPPOSITORY, RECTAL RECTAL DAILY PRN
Status: DISCONTINUED | OUTPATIENT
Start: 2017-06-22 | End: 2017-06-21 | Stop reason: HOSPADM

## 2017-06-20 RX ORDER — OXYTOCIN 10 [USP'U]/ML
10 INJECTION, SOLUTION INTRAMUSCULAR; INTRAVENOUS
Status: DISCONTINUED | OUTPATIENT
Start: 2017-06-20 | End: 2017-06-21 | Stop reason: HOSPADM

## 2017-06-20 RX ADMIN — SODIUM CHLORIDE, POTASSIUM CHLORIDE, SODIUM LACTATE AND CALCIUM CHLORIDE: 600; 310; 30; 20 INJECTION, SOLUTION INTRAVENOUS at 07:46

## 2017-06-20 RX ADMIN — Medication 10 ML/HR: at 00:06

## 2017-06-20 RX ADMIN — ONDANSETRON 4 MG: 2 INJECTION INTRAMUSCULAR; INTRAVENOUS at 00:19

## 2017-06-20 RX ADMIN — ONDANSETRON 4 MG: 2 INJECTION INTRAMUSCULAR; INTRAVENOUS at 06:06

## 2017-06-20 RX ADMIN — ONDANSETRON 4 MG: 2 INJECTION INTRAMUSCULAR; INTRAVENOUS at 11:28

## 2017-06-20 RX ADMIN — ACETAMINOPHEN 650 MG: 325 TABLET, FILM COATED ORAL at 19:21

## 2017-06-20 RX ADMIN — FENTANYL CITRATE 100 MCG: 50 INJECTION, SOLUTION INTRAMUSCULAR; INTRAVENOUS at 00:01

## 2017-06-20 RX ADMIN — NITROFURANTOIN 100 MG: 25; 75 CAPSULE ORAL at 20:14

## 2017-06-20 RX ADMIN — Medication 5 ML: at 00:01

## 2017-06-20 RX ADMIN — ACETAMINOPHEN 650 MG: 325 TABLET, FILM COATED ORAL at 06:27

## 2017-06-20 RX ADMIN — IBUPROFEN 800 MG: 800 TABLET ORAL at 14:11

## 2017-06-20 RX ADMIN — SENNOSIDES AND DOCUSATE SODIUM 1 TABLET: 8.6; 5 TABLET ORAL at 19:21

## 2017-06-20 RX ADMIN — PRENATAL VIT W/ FE FUMARATE-FA TAB 27-0.8 MG 1 TABLET: 27-0.8 TAB at 20:14

## 2017-06-20 RX ADMIN — Medication 15 MG: at 18:18

## 2017-06-20 RX ADMIN — IBUPROFEN 800 MG: 400 TABLET ORAL at 20:14

## 2017-06-20 RX ADMIN — Medication 5 ML: at 00:06

## 2017-06-20 NOTE — PROGRESS NOTES
Comfortable with epidural  /63  Pulse 83  Temp 98.3  F (36.8  C) (Oral)  Resp 16  LMP 09/19/2016  SpO2 100%  [place]: q 2-6 Pit @ 6  FHT: Tier 1  SVE: 5.5/90/-1    A/P 39w1d SROM, augmentation    1. Pitocin augmentation per protocol.  Prolonged early active labor, but dysfunctional pattern.  Continue to titrate pitocin accordingly.   2. Epidural per anesthesia  3. Tier 1 tracing  4. GBS negative. Afebrile.    Janie Lantigua M.D.

## 2017-06-20 NOTE — L&D DELIVERY NOTE
37 year old  presented @ 39w1d to [facility] for SROM.     Stage 1: patient diagnosed in the office with SROM by amnisure.  Pitocin augmentation initiated.  Epidural for analgesia.  Initially slow transition into active phase, but once in a good labor pattern, patient progressed to complete  Stage 2: While I was on my way to delivery, the patient spontaneously delivered with the nurse.  I arrived before 1 minute of life.  Cord clamped and cut.     of viable female, 8#10, Apgars 7/9  Placenta with 3vc delivered spontaneous and intact by me  Lacerations: none  Mother and infant stable.    Janie Lantgiua M.D.    Delivery Summary    Jennifer A Schwab MRN# [id number]   Age: 37 year old YOB: 1980         Labor Event Times    Labor onset date:  17 Onset time:  12:10 AM   Dilation complete date:  17 Complete time:  12:25 PM   Start pushing date/time:  2017 1235            Labor Events     labor?:  No   Labor Type:  Augmentation   Predominate monitoring during 1st stage:  continuous electronic fetal monitoring      Antibiotics received during labor?:  No      Rupture identifier:  Rupture 1   Rupture date/time: 17 1655   Rupture type:  Spontaneous rupture of membranes prior to induction   Fluid color:  Clear   Fluid odor:  Normal      Augmentation:  Oxytocin   Indications for augmentation:  Prolonged ROM   1:1 continuous labor support provided by?:  RN Labor partogram used?:  yes         Delivery/Placenta Date and Time    Delivery Date:  17 Delivery Time:   1:19 PM   Placenta Date/Time:  2017  1:23 PM   Oxytocin given at the time of delivery:  after delivery of baby      Vaginal Counts    Initial count performed by 2 team members:   Two Team Members   Lou Barr          Needles Suture Ramah Sponges Instruments   Initial counts 2  5    Added to count       Final counts          Placed during labor Accounted for at the end of labor   No    No       Final  "count performed by 2 team members:   Two Team Members   Pepe Rivera         Final count correct?:  Yes         Apgars    Living status:  Living    1 Minute 5 Minute 10 Minute 15 Minute 20 Minute   Skin color: 0  1       Heart rate: 2  2       Reflex irritability: 2  2       Muscle tone: 1  2       Respiratory effort: 2  2       Total: 7  9          Apgars assigned by:  SHELBY RIVERA      Cord    Vessels:  3 Vessels Complications:  Nuchal   Cord Blood Disposition:  Lab Gases Sent?:  No          Resuscitation    Methods:  None          Measurements    Weight:  138 oz Length:  20.75\"   Head circumference:  0.33 m       Skin to Skin and Feeding Plan    Skin to skin initiation date/time: 17 1323   Skin to skin with:  Mother   Skin to skin end date/time:     How do you plan to feed your baby:  Breastfeeding      Labor Events and Shoulder Dystocia    Fetal Tracing Prior to Delivery:  Category 2   Shoulder dystocia present?:  Neg            Delivery (Maternal) (Provider to Complete) ([id number])    Episiotomy:  None         Mother's Information  Mother: Schwab Sheri HERRERA #[id number]    Start of Mother's Information     IO Blood Loss  17 0010 - 17 1533    Mom's I/O Activity            End of Mother's Information  Mother: Schwab, Jennifer A #[id number]            Delivery - Provider to Complete ([id number])    Delivering clinician:  SHELBY RIVERA   Attempted Delivery Types (Choose all that apply):  Spontaneous Vaginal Delivery   Delivery Type (Choose the 1 that will go to the Birth History):  Vaginal, Spontaneous Delivery                     Other personnel:   Provider Role   SHELBY RVIERA CINDY BECKER, JULIE LIN Obstetrician            Placenta    Delayed Cord Clamping:  Done   Date/Time:  2017  1:23 PM   Removal:  Spontaneous   Disposition:  Hospital disposal      Anesthesia    Method:  Epidural         Presentation and Position    Presentation:  " Vertex                    Janie Lantigua MD

## 2017-06-20 NOTE — PROGRESS NOTES
Esteban Clemente CRNA called and in room at 2330. Patient and procedure correctly identified/verified with CRNA. Time out completed. Consent signed. 700cc fluid bolus given. Patient in position for epidural placement. Epidural placed without complications. Test dose/bolus given by CRNA and patient tolerated well. Patient rates her pain after procedure as 1/10. Ephedrine Not given .

## 2017-06-20 NOTE — PLAN OF CARE
Problem: Labor (Cervical Ripen, Induct, Augment) (Adult,Obstetrics,Pediatric)  Goal: Signs and Symptoms of Listed Potential Problems Will be Absent or Manageable (Labor)  Signs and symptoms of listed potential problems will be absent or manageable by discharge/transition of care (reference Labor (Cervical Ripen, Induct, Augment) (Adult,Obstetrics,Pediatric) CPG).   Outcome: Completed Date Met:  17  Pt had a  of a viable female infant today at 13:19. Dr. Lantigua was called for delivery but did not make it time. This RN and ARLENE Cantu charge RN attended delivery. CAnX1 tight which was redduced without difficulty. Dr. Lantigua arrived immediately after the birth for completion of placental delivery and evaluate of pt. Routine postpartum cares expected.

## 2017-06-20 NOTE — PROVIDER NOTIFICATION
Dr. Lantigua is on the unit. Updated on [facility]'S cat      06/20/17 0843   Provider Notification   Provider Name/Title Dr. Lantigua   Method of Notification In Department   Request Evaluate - Remote   Notification Reason Labor Status;Uterine Activity;Status Update;SVE;Other (Comment)   2 only because of 2 intermittent variables with SVE.  IV Pitocin increased due to lack of progress. Will continue to monitor and adjust IV Pitocin as appropriate.

## 2017-06-20 NOTE — ANESTHESIA PROCEDURE NOTES
Peripheral nerve/Neuraxial procedure note : epidural catheter  Pre-Procedure  Performed by  MARLENA VAUGHAN   Location: OB    Procedure Times:6/19/2017 11:38 PM and 6/19/2017 11:56 PM  Pre-Anesthestic Checklist: patient identified, IV checked, risks and benefits discussed, informed consent, monitors and equipment checked, pre-op evaluation and at physician/surgeon's request    Timeout  Correct Patient: Yes   Correct Procedure: Yes   Correct Site: Yes   Correct Laterality: N/A   Correct Position: Yes   Site Marked: N/A   .   Procedure Documentation    .    Procedure:    Epidural catheter.  Insertion Site:L2-3  (midline approach) Injection technique: LORT saline   Local skin infiltrated with 9 mL of 1% lidocaine.  DAWN at 5 cm     Patient Prep;mask, sterile gloves, patient draped.  .  Needle: Touhy needle (17 G. 3.5 in). # of attempts: 1. # of redirects:.  Spinal Needle: . . . Catheter: 19 G . .  Catheter threaded easily  4 cm epidural space.  9 cm at skin.   .    Assessment/Narrative  Paresthesias: No.  .  .  Aspiration negative for heme or CSF  . Test dose of 3 mL lidocaine 1.5% w/ 1:200,000 epinephrine at 23:56.  Test dose negative for signs of intravascular, subdural or intrathecal injection. Comments:  VAS pain score prior to epidural:  5    VAS pain score after epidural:  0    Pt. Tolerated well, FHR stable.

## 2017-06-20 NOTE — PROGRESS NOTES
S: Admit to Inpatient   A:          FHR  Monitor: External US  Variability: Moderate  FHTs are category 1.  Uterine Activity  Monitor: Girard  Prenatal Office Visit on 06/19/2017   Component Date Value     Amnisure 06/19/2017 Positive*     Dr. NANDINI Lantigua informed of above and admission orders received.   R: Plan of care reviewed with patient. Oriented to room. Reviewed resource binder, [facility] book and paperwork to complete.

## 2017-06-20 NOTE — PLAN OF CARE
Problem: Goal Outcome Summary  Goal: Goal Outcome Summary  Outcome: Improving  Pt has progressed throughout this shift

## 2017-06-20 NOTE — ANESTHESIA PREPROCEDURE EVALUATION
Anesthesia Evaluation     .             ROS/MED HX    ENT/Pulmonary:  - neg pulmonary ROS     Neurologic:  - neg neurologic ROS     Cardiovascular:  - neg cardiovascular ROS       METS/Exercise Tolerance:     Hematologic:         Musculoskeletal:         GI/Hepatic:  - neg GI/hepatic ROS       Renal/Genitourinary:         Endo:         Psychiatric:         Infectious Disease:         Malignancy:         Other:                     Physical Exam  Normal systems: cardiovascular, pulmonary and dental    Airway   Mallampati: II  TM distance: > 3 FB  Neck ROM: full  Mouth opening: > 3 cm    Dental     Cardiovascular       Pulmonary           neg OB ROS                 Anesthesia Plan      History & Physical Review      ASA Status:  .  OB Epidural Asa: 2            Postoperative Care      Consents  Anesthetic plan, risks, benefits and alternatives discussed with:  Patient..                          .

## 2017-06-21 VITALS
OXYGEN SATURATION: 98 % | TEMPERATURE: 97.9 F | RESPIRATION RATE: 18 BRPM | SYSTOLIC BLOOD PRESSURE: 125 MMHG | DIASTOLIC BLOOD PRESSURE: 70 MMHG | HEART RATE: 71 BPM

## 2017-06-21 LAB — HGB BLD-MCNC: 7.7 G/DL (ref 11.7–15.7)

## 2017-06-21 PROCEDURE — 85018 HEMOGLOBIN: CPT | Performed by: OBSTETRICS & GYNECOLOGY

## 2017-06-21 PROCEDURE — 36415 COLL VENOUS BLD VENIPUNCTURE: CPT | Performed by: OBSTETRICS & GYNECOLOGY

## 2017-06-21 PROCEDURE — 25000132 ZZH RX MED GY IP 250 OP 250 PS 637: Performed by: OBSTETRICS & GYNECOLOGY

## 2017-06-21 RX ORDER — IBUPROFEN 800 MG/1
800 TABLET, FILM COATED ORAL EVERY 8 HOURS PRN
Qty: 90 TABLET | Refills: 1 | Status: SHIPPED | OUTPATIENT
Start: 2017-06-21

## 2017-06-21 RX ADMIN — IBUPROFEN 800 MG: 400 TABLET ORAL at 15:32

## 2017-06-21 RX ADMIN — Medication 15 MG: at 06:45

## 2017-06-21 RX ADMIN — ACETAMINOPHEN 650 MG: 325 TABLET, FILM COATED ORAL at 05:10

## 2017-06-21 RX ADMIN — IBUPROFEN 800 MG: 400 TABLET ORAL at 02:27

## 2017-06-21 RX ADMIN — PRENATAL VIT W/ FE FUMARATE-FA TAB 27-0.8 MG 1 TABLET: 27-0.8 TAB at 08:12

## 2017-06-21 RX ADMIN — IBUPROFEN 800 MG: 400 TABLET ORAL at 08:12

## 2017-06-21 RX ADMIN — SENNOSIDES AND DOCUSATE SODIUM 2 TABLET: 8.6; 5 TABLET ORAL at 08:12

## 2017-06-21 RX ADMIN — NITROFURANTOIN 100 MG: 25; 75 CAPSULE ORAL at 06:45

## 2017-06-21 RX ADMIN — ACETAMINOPHEN 650 MG: 325 TABLET, FILM COATED ORAL at 00:53

## 2017-06-21 NOTE — ANESTHESIA CARE TRANSFER NOTE
Patient: Jennifer A Schwab    * No procedures listed *    Diagnosis: * No pre-op diagnosis entered *  Diagnosis Additional Information: No value filed.    Anesthesia Type:   No value filed.     Note:  Airway :Room Air  Patient transferred to:Labor and Delivery        Vitals: (Last set prior to Anesthesia Care Transfer)              Electronically Signed By: BOB Jean Baptiste CRNA  June 21, 2017  7:06 AM

## 2017-06-21 NOTE — PLAN OF CARE
Problem: Goal Outcome Summary  Goal: Goal Outcome Summary  Outcome: Improving  Patient following PP pathway without incident. Pain controlled with ibuprofen and tylenol, using ice packs. Patient up independently in room, voiding adequate amounts. Patient choosing to bottle feed infant and able to do so independently. FOB at bedside and supportive. Bonding well with infant.

## 2017-06-21 NOTE — ANESTHESIA POSTPROCEDURE EVALUATION
Patient: Jennifer A Schwab    * No procedures listed *    Diagnosis:* No pre-op diagnosis entered *  Diagnosis Additional Information: No value filed.    Anesthesia Type:  No value filed.    Note:  Anesthesia Post Evaluation    Patient location during evaluation: Floor  Patient participation: Able to fully participate in evaluation  Level of consciousness: awake and alert  Pain management: adequate  Airway patency: patent  Cardiovascular status: acceptable and hemodynamically stable  Respiratory status: acceptable, room air and spontaneous ventilation  Hydration status: acceptable  PONV: none     Anesthetic complications: None          Last vitals:  Vitals:    06/20/17 1515 06/20/17 1916 06/21/17 0053   BP: 119/57 117/59 123/64   Pulse:   71   Resp:  16 16   Temp:  36.6  C (97.9  F) 36.3  C (97.4  F)   SpO2:   100%         Electronically Signed By: BOB Jean Baptiste CRNA  June 21, 2017  7:07 AM

## 2017-06-21 NOTE — DISCHARGE SUMMARY
[facility] Discharge Summary    Jennifer A Schwab MRN# [id number]   Age: 37 year old YOB: 1980     Date of Admission:  6/19/2017  Date of Discharge::  6/21/2017  Admitting Physician:  Janie Lantigua MD  Discharge Physician:  Damion Callahan MD     Home clinic: [facility]          Admission Diagnoses:   ROM (rupture of membranes), premature  Vaginal delivery          Discharge Diagnosis:   Normal spontaneous vaginal delivery  Intrauterine pregnancy at 39+1 weeks gestation          Procedures:   Procedure(s): No additional procedures performed       No other procedures performed during this admission           Medications Prior to Admission:     Prescriptions Prior to Admission   Medication Sig Dispense Refill Last Dose     nitrofurantoin, macrocrystal-monohydrate, (MACROBID) 100 MG capsule Take 1 capsule (100 mg) by mouth 2 times daily 14 capsule 0 6/19/2017 at 2100     cetirizine (ZYRTEC) 10 MG tablet Take 10 mg by mouth every evening    6/18/2017 at pm     Lansoprazole (PREVACID PO) Take 15 mg by mouth every morning (before breakfast)   6/19/2017 at am     Prenatal Vit-Fe Fumarate-FA (PRENATAL MULTIVITAMIN  PLUS IRON) 27-0.8 MG TABS Take 1 tablet by mouth daily Reported on 5/2/2017 6/18/2017 at am             Discharge Medications:     Current Discharge Medication List      START taking these medications    Details   ibuprofen (ADVIL/MOTRIN) 800 MG tablet Take 1 tablet (800 mg) by mouth every 8 hours as needed for moderate pain  Qty: 90 tablet, Refills: 1    Associated Diagnoses: Vaginal delivery         CONTINUE these medications which have NOT CHANGED    Details   nitrofurantoin, macrocrystal-monohydrate, (MACROBID) 100 MG capsule Take 1 capsule (100 mg) by mouth 2 times daily  Qty: 14 capsule, Refills: 0    Associated Diagnoses: Acute cystitis without hematuria      cetirizine (ZYRTEC) 10 MG tablet Take 10 mg by mouth every evening       Lansoprazole (PREVACID PO)  "Take 15 mg by mouth every morning (before breakfast)      Prenatal Vit-Fe Fumarate-FA (PRENATAL MULTIVITAMIN  PLUS IRON) 27-0.8 MG TABS Take 1 tablet by mouth daily Reported on 2017                   Consultations:   No consultations were requested during this admission          Brief History of Labor:   Expand All Collapse All    []Hide copied text  []Girishver for attribution information  37 year old  presented @ 39w1d to [facility] for SROM.      Stage 1: patient diagnosed in the office with SROM by amnisure.  Pitocin augmentation initiated.  Epidural for analgesia.  Initially slow transition into active phase, but once in a good labor pattern, patient progressed to complete  Stage 2: While I was on my way to delivery, the patient spontaneously delivered with the nurse.  I arrived before 1 minute of life.  Cord clamped and cut.      of viable female, 8#10, Apgars 7/9  Placenta with 3vc delivered spontaneous and intact by me  Lacerations: none  Mother and infant stable.   \"[facility]\"             Hospital Course:   The patient's hospital course was unremarkable.  On discharge, her pain was well controlled. Vaginal bleeding is similar to peak menstrual flow.  Voiding without difficulty.  Ambulating well and tolerating a normal diet.  No fever.  Bottle feeding well.  Infant is stable.  No bowel movement yet.*  She was discharged on post-partum day #2.    /70  Pulse 71  Temp 97.9  F (36.6  C) (Oral)  Resp 18  LMP 2016  SpO2 98%  Breastfeeding? Unknown  Exam:  Constitutional: healthy, alert and no distress  Head: Normocephalic. No masses, lesions, tenderness or abnormalities  Gastrointestinal: Abdomen soft, non-tender. BS normal. No masses, organomegaly FF@U-2  : Deferred  Musculoskeletal: extremities normal- no gross deformities noted, gait normal and normal muscle tone  Skin: no suspicious lesions or rashes  Neurologic: Gait normal. Reflexes normal and symmetric. Sensation grossly " WNL.  Psychiatric: mentation appears normal and affect normal/bright        Post-partum hemoglobin:   Hemoglobin   Date Value Ref Range Status   06/21/2017 7.7 (L) 11.7 - 15.7 g/dL Final             Discharge Instructions and Follow-Up:   Discharge diet: Regular   Discharge activity: Pelvic rest: abstain from intercourse and do not use tampons for 6 week(s)   Discharge follow-up: Follow up with Dr. chavez in 6 weeks   Wound care: Drink plenty of fluids  Ice to area for comfort           Discharge Disposition:   Discharged to home      Attestation:  I have reviewed today's vital signs, notes, medications, labs and imaging.  Amount of time performed on this discharge summary: 7 minutes.    Damion Callahan MD

## 2017-06-21 NOTE — PLAN OF CARE
Problem: Discharge Planning  Goal: Discharge Planning (Adult, OB, Behavioral, Peds)  Outcome: Adequate for Discharge Date Met:  06/21/17  Patient discharged per ambulatory with infant in car seat. Mother verified that her band matches her infant's band by comparing the infant's  MR#.  Discharge instructions given. Encouraged to call for any problems, questions or concerns. RXs sent to [facility].

## 2017-06-21 NOTE — PLAN OF CARE
Problem: Postpartum, Vaginal Delivery (Adult)  Goal: Signs and Symptoms of Listed Potential Problems Will be Absent or Manageable (Postpartum, Vaginal Delivery)  Signs and symptoms of listed potential problems will be absent or manageable by discharge/transition of care (reference Postpartum, Vaginal Delivery (Adult) CPG).   Outcome: Improving  VSS; postpartum assessment WDL - see flowsheet. Pt eating and drinking; denies nausea. Pt ambulating independently in room; voiding.  Fundus firm, midline, U/U; small amount bleeding - denies clots.   Pain plan discussed - pt requested that pain medication be brought when due. Pt woken for pain medication per her request. Pt reported uterine cramping pain; PRN ibuprofen and tylenol administered per order; pt found to be sleeping upon reassessment and able to sleep between cares.  Edema noted in LLE; pt denies sx of preeclampsia; BP WDL.    Pt bottle feeding baby every 3-5 hours. Attentive to baby's cues; independent in self and baby cares. Spouse present and attentive to pt and infant cares. Questions encouraged and answered.

## 2017-06-21 NOTE — DISCHARGE INSTRUCTIONS
Discharge Instructions and Follow-Up:   Discharge diet: Regular   Discharge activity: Pelvic rest: abstain from intercourse and do not use tampons for 6 week(s)   Discharge follow-up: Follow up with Dr. chavez in 6 weeks   Wound care: Drink plenty of fluids  Ice to area for comfort     Postpartum Vaginal Delivery Instructions    Activity       Ask family and friends for help when you need it.    Do not place anything in your vagina for 6 weeks.    You are not restricted on other activities, but take it easy for a few weeks to allow your body to recover from delivery.  You are able to do any activities you feel up to that point.    No driving until you have stopped taking your pain medications (usually two weeks after delivery).     Call your health care provider if you have any of these symptoms:       Increased pain, swelling, redness, or fluid around your stiches from an episiotomy or perineal tear.    A fever above 100.4 F (38 C) with or without chills when placing a thermometer under your tongue.    You soak a sanitary pad with blood within 1 hour, or you see blood clots larger than a golf ball.    Bleeding that lasts more than 6 weeks.    Vaginal discharge that smells bad.    Severe pain, cramping or tenderness in your lower belly area.    A need to urinate more frequently (use the toilet more often), more urgently (use the toilet very quickly), or it burns when you urinate.    Nausea and vomiting.    Redness, swelling or pain around a vein in your leg.    Problems breastfeeding or a red or painful area on your breast.    Chest pain and cough or are gasping for air.    Problems coping with sadness, anxiety, or depression.  If you have any concerns about hurting yourself or the baby, call your provider immediately.     You have questions or concerns after you return home.     Keep your hands clean:  Always wash your hands before touching your perineal area and stitches.  This helps reduce your risk of  infection.  If your hands aren't dirty, you may use an alcohol hand-rub to clean your hands. Keep your nails clean and short.

## 2017-06-21 NOTE — PROGRESS NOTES
Data: Vital signs within normal limits. Postpartum checks within normal limits - see flow record. Patient eating and drinking normally. Patient able to empty bladder independently. Patient ambulating independently. No apparent signs of infection. perineum healing well. Patient is performing self cares and is able to care for infant. Positive attachment behaviors are observed with infant. Support persons are present.  Action: Patient medicated with ibuprofen during the shift for pain and cramping. See MAR.Patient education done about formula feeding,  cares, postpartum cares, pain management/plan, rest and discharge from hospital. See flow record.  Response:   Patient reassessed within 1 hour after each medication and pain. Patient stated that pain had improved. Patient stated that she was comfortable. .   Plan: Pt denies any further questions or concerns at this time. Hits call light appropriately to make needs known. Call light within reach. Anticipate discharge on 2017 per pt request.

## 2017-06-23 ENCOUNTER — APPOINTMENT (OUTPATIENT)
Dept: CT IMAGING | Facility: CLINIC | Age: 37
End: 2017-06-23
Attending: EMERGENCY MEDICINE
Payer: COMMERCIAL

## 2017-06-23 ENCOUNTER — APPOINTMENT (OUTPATIENT)
Dept: ULTRASOUND IMAGING | Facility: CLINIC | Age: 37
End: 2017-06-23
Attending: EMERGENCY MEDICINE
Payer: COMMERCIAL

## 2017-06-23 ENCOUNTER — TELEPHONE (OUTPATIENT)
Dept: OBGYN | Facility: CLINIC | Age: 37
End: 2017-06-23

## 2017-06-23 ENCOUNTER — HOSPITAL ENCOUNTER (EMERGENCY)
Facility: CLINIC | Age: 37
Discharge: HOME | End: 2017-06-23
Attending: EMERGENCY MEDICINE | Admitting: EMERGENCY MEDICINE
Payer: COMMERCIAL

## 2017-06-23 VITALS
DIASTOLIC BLOOD PRESSURE: 88 MMHG | SYSTOLIC BLOOD PRESSURE: 137 MMHG | TEMPERATURE: 98.1 F | HEART RATE: 67 BPM | OXYGEN SATURATION: 97 %

## 2017-06-23 DIAGNOSIS — R07.89 CHEST WALL PAIN: ICD-10-CM

## 2017-06-23 DIAGNOSIS — R60.0 EDEMA OF BOTH LEGS: ICD-10-CM

## 2017-06-23 LAB
ALBUMIN UR-MCNC: 10 MG/DL
ANION GAP SERPL CALCULATED.3IONS-SCNC: 9 MMOL/L (ref 3–14)
APPEARANCE UR: CLEAR
BASOPHILS # BLD AUTO: 0 10E9/L (ref 0–0.2)
BASOPHILS NFR BLD AUTO: 0.4 %
BILIRUB UR QL STRIP: NEGATIVE
BUN SERPL-MCNC: 7 MG/DL (ref 7–30)
CALCIUM SERPL-MCNC: 8.9 MG/DL (ref 8.5–10.1)
CHLORIDE SERPL-SCNC: 109 MMOL/L (ref 94–109)
CO2 SERPL-SCNC: 24 MMOL/L (ref 20–32)
COLOR UR AUTO: YELLOW
CREAT SERPL-MCNC: 0.62 MG/DL (ref 0.52–1.04)
D DIMER PPP FEU-MCNC: 1.4 UG/ML FEU (ref 0–0.5)
DIFFERENTIAL METHOD BLD: ABNORMAL
EOSINOPHIL # BLD AUTO: 0.5 10E9/L (ref 0–0.7)
EOSINOPHIL NFR BLD AUTO: 4.6 %
ERYTHROCYTE [DISTWIDTH] IN BLOOD BY AUTOMATED COUNT: 13.3 % (ref 10–15)
GFR SERPL CREATININE-BSD FRML MDRD: ABNORMAL ML/MIN/1.7M2
GLUCOSE SERPL-MCNC: 68 MG/DL (ref 70–99)
GLUCOSE UR STRIP-MCNC: NEGATIVE MG/DL
HCT VFR BLD AUTO: 28.7 % (ref 35–47)
HGB BLD-MCNC: 9 G/DL (ref 11.7–15.7)
HGB UR QL STRIP: ABNORMAL
IMM GRANULOCYTES # BLD: 0.1 10E9/L (ref 0–0.4)
IMM GRANULOCYTES NFR BLD: 0.6 %
KETONES UR STRIP-MCNC: NEGATIVE MG/DL
LEUKOCYTE ESTERASE UR QL STRIP: ABNORMAL
LYMPHOCYTES # BLD AUTO: 2.6 10E9/L (ref 0.8–5.3)
LYMPHOCYTES NFR BLD AUTO: 25.6 %
MCH RBC QN AUTO: 26.5 PG (ref 26.5–33)
MCHC RBC AUTO-ENTMCNC: 31.4 G/DL (ref 31.5–36.5)
MCV RBC AUTO: 85 FL (ref 78–100)
MONOCYTES # BLD AUTO: 0.8 10E9/L (ref 0–1.3)
MONOCYTES NFR BLD AUTO: 8 %
MUCOUS THREADS #/AREA URNS LPF: PRESENT /LPF
NEUTROPHILS # BLD AUTO: 6 10E9/L (ref 1.6–8.3)
NEUTROPHILS NFR BLD AUTO: 60.8 %
NITRATE UR QL: NEGATIVE
PH UR STRIP: 7.5 PH (ref 5–7)
PLATELET # BLD AUTO: 350 10E9/L (ref 150–450)
POTASSIUM SERPL-SCNC: 3.8 MMOL/L (ref 3.4–5.3)
RBC # BLD AUTO: 3.39 10E12/L (ref 3.8–5.2)
RBC #/AREA URNS AUTO: >182 /HPF (ref 0–2)
SODIUM SERPL-SCNC: 142 MMOL/L (ref 133–144)
SP GR UR STRIP: 1.01 (ref 1–1.03)
SQUAMOUS #/AREA URNS AUTO: 1 /HPF (ref 0–1)
URN SPEC COLLECT METH UR: ABNORMAL
UROBILINOGEN UR STRIP-MCNC: NORMAL MG/DL (ref 0–2)
WBC # BLD AUTO: 10 10E9/L (ref 4–11)
WBC #/AREA URNS AUTO: 22 /HPF (ref 0–2)

## 2017-06-23 PROCEDURE — 87086 URINE CULTURE/COLONY COUNT: CPT | Performed by: EMERGENCY MEDICINE

## 2017-06-23 PROCEDURE — 99284 EMERGENCY DEPT VISIT MOD MDM: CPT | Mod: 25

## 2017-06-23 PROCEDURE — 85025 COMPLETE CBC W/AUTO DIFF WBC: CPT | Performed by: EMERGENCY MEDICINE

## 2017-06-23 PROCEDURE — 85379 FIBRIN DEGRADATION QUANT: CPT | Performed by: EMERGENCY MEDICINE

## 2017-06-23 PROCEDURE — 80048 BASIC METABOLIC PNL TOTAL CA: CPT | Performed by: EMERGENCY MEDICINE

## 2017-06-23 PROCEDURE — 25000125 ZZHC RX 250: Performed by: EMERGENCY MEDICINE

## 2017-06-23 PROCEDURE — 99284 EMERGENCY DEPT VISIT MOD MDM: CPT | Performed by: EMERGENCY MEDICINE

## 2017-06-23 PROCEDURE — 81001 URINALYSIS AUTO W/SCOPE: CPT | Performed by: EMERGENCY MEDICINE

## 2017-06-23 PROCEDURE — 71260 CT THORAX DX C+: CPT

## 2017-06-23 PROCEDURE — 25000128 H RX IP 250 OP 636: Performed by: EMERGENCY MEDICINE

## 2017-06-23 PROCEDURE — 93970 EXTREMITY STUDY: CPT

## 2017-06-23 RX ORDER — FUROSEMIDE 20 MG
20 TABLET ORAL 2 TIMES DAILY
Qty: 8 TABLET | Refills: 0 | Status: SHIPPED | OUTPATIENT
Start: 2017-06-23

## 2017-06-23 RX ORDER — IOPAMIDOL 755 MG/ML
81 INJECTION, SOLUTION INTRAVASCULAR ONCE
Status: COMPLETED | OUTPATIENT
Start: 2017-06-23 | End: 2017-06-23

## 2017-06-23 RX ADMIN — IOPAMIDOL 81 ML: 755 INJECTION, SOLUTION INTRAVENOUS at 18:26

## 2017-06-23 RX ADMIN — SODIUM CHLORIDE 104 ML: 9 INJECTION, SOLUTION INTRAVENOUS at 18:26

## 2017-06-23 NOTE — TELEPHONE ENCOUNTER
Reason for Call:  Other     Detailed comments: Experiencing swelling in legs (from the knees down - majority in calves, ankles, and feet) before and since delivery (06/20) - Has been staying off of them, elevating her legs for 2 days. It is quite painful. No redness. She did have preeclampsia after delivery with her first delivery and ended up getting a water pill. She is wondering if she needs that again, or what she should do? - Please advise.     Pt has a slight headache, but relates that to not sleeping. Bleeding is less.     Phone Number Patient can be reached at: Home number on file [phone] (home)    Best Time: Any    Can we leave a detailed message on this number? Not Applicable    Call taken on 6/23/2017 at 10:59 AM by Denise Behrendt

## 2017-06-23 NOTE — ED PROVIDER NOTES
History     Chief Complaint   Patient presents with     Leg Swelling     bilat leg swelling and ha, 4 days post partum     HPI  Jennifer A Schwab is a 37 year old female status post normal spontaneous vaginal delivery 4 days ago who presents to emergency department complaining of bilateral lower extremity edema.  Patient states she had preeclampsia post partum period of her 1st pregnancy.  She states that she just hasn't been feeling well since the delivery.  She does not know if this is just because she has not slept but today she had a headache and earlier in the day had an episode of chest pain on the right side which did not radiate.  She states made her ribs hurt.  She is not short of breath and not had a cough.  Denies any abdominal pain or back pain.  She has not had any urinary symptoms.  She denies any focal numbness weakness and extremity.  Past Medical History:   Diagnosis Date     Cervical high risk HPV (human papillomavirus) test positive 11/17/2016     Intussusception intestine (H) 2013     Preeclampsia in postpartum period 2010    1 week after 2nd delivery       No current facility-administered medications on file prior to encounter.   Current Outpatient Prescriptions on File Prior to Encounter:  ibuprofen (ADVIL/MOTRIN) 800 MG tablet Take 1 tablet (800 mg) by mouth every 8 hours as needed for moderate pain   nitrofurantoin, macrocrystal-monohydrate, (MACROBID) 100 MG capsule Take 1 capsule (100 mg) by mouth 2 times daily   cetirizine (ZYRTEC) 10 MG tablet Take 10 mg by mouth every evening    Lansoprazole (PREVACID PO) Take 15 mg by mouth every morning (before breakfast)   Prenatal Vit-Fe Fumarate-FA (PRENATAL MULTIVITAMIN  PLUS IRON) 27-0.8 MG TABS Take 1 tablet by mouth daily Reported on 5/2/2017     Social History     Social History     Marital status:      Spouse name: N/A     Number of children: 2     Years of education: N/A     Occupational History     Not on file.     Social History  Main Topics     Smoking status: Current Every Day Smoker     Packs/day: 0.25     Smokeless tobacco: Not on file      Comment: smoking 20cig/day- down to 5 cig/day with pregnancy     Alcohol use 0.0 oz/week     0 Standard drinks or equivalent per week      Comment: rare- quit with pregnancy     Drug use: No     Sexual activity: Yes     Other Topics Concern     Not on file     Social History Narrative       I have reviewed the Medications, Allergies, Past Medical and Surgical History, and Social History in the Epic system.         Review of Systems   Constitutional: Positive for activity change and appetite change. Negative for chills and fever.   HENT: Negative for congestion, sore throat and trouble swallowing.    Respiratory: Positive for shortness of breath. Negative for cough and chest tightness.    Cardiovascular: Positive for chest pain and leg swelling. Negative for palpitations.   Gastrointestinal: Positive for nausea. Negative for abdominal pain and vomiting.   Genitourinary: Positive for vaginal bleeding. Negative for decreased urine volume and dysuria.   Musculoskeletal: Positive for arthralgias and myalgias. Negative for back pain, gait problem and neck pain.   Skin: Negative for rash.   Neurological: Negative for dizziness, weakness, light-headedness and headaches.   Hematological: Does not bruise/bleed easily.   Psychiatric/Behavioral: Negative for confusion.       Physical Exam   BP: 137/87  Pulse: 67  Temp: 98.1  F (36.7  C)  SpO2: 99 %  Physical Exam   Constitutional: She is oriented to person, place, and time. She appears well-developed and well-nourished. No distress.   HENT:   Head: Normocephalic.   Mouth/Throat: Oropharynx is clear and moist.   Eyes: Conjunctivae are normal.   Neck: Normal range of motion. Neck supple. No JVD present.   Cardiovascular: Normal rate, regular rhythm, normal heart sounds and intact distal pulses.    Pulmonary/Chest: Effort normal. She has no wheezes. She has no  rales.   Breath sounds are slightly decreased at the bases.   Abdominal: Soft. Bowel sounds are normal. There is no tenderness.   Musculoskeletal:   B +2 pitting edema located in the lower legs ankle and feet. No erythema present . Mild calf tenderness. Pulses and sensation are symmetrical.   Neurological: She is alert and oriented to person, place, and time. She has normal reflexes. She exhibits normal muscle tone.   Skin: Skin is warm and dry. No rash noted.   Psychiatric: She has a normal mood and affect.   Nursing note and vitals reviewed.      ED Course     ED Course     Procedures             Critical Care time:  none               Labs Ordered and Resulted from Time of ED Arrival Up to the Time of Departure from the ED   CBC WITH PLATELETS DIFFERENTIAL - Abnormal; Notable for the following:        Result Value    RBC Count 3.39 (*)     Hemoglobin 9.0 (*)     Hematocrit 28.7 (*)     MCHC 31.4 (*)     All other components within normal limits   BASIC METABOLIC PANEL - Abnormal; Notable for the following:     Glucose 68 (*)     All other components within normal limits   URINE MACROSCOPIC WITH REFLEX TO MICRO - Abnormal; Notable for the following:     Blood Urine Moderate (*)     pH Urine 7.5 (*)     Protein Albumin Urine 10 (*)     Leukocyte Esterase Urine Small (*)     RBC Urine >182 (*)     WBC Urine 22 (*)     Mucous Urine Present (*)     All other components within normal limits   D DIMER QUANTITATIVE - Abnormal; Notable for the following:     D Dimer 1.4 (*)     All other components within normal limits   STRICT INTAKE AND OUTPUT     Medications   iopamidol (ISOVUE-370) solution 81 mL (81 mLs Intravenous Given 6/23/17 1826)   sodium chloride 0.9 % for CT scan flush dose 104 mL (104 mLs Intravenous Given 6/23/17 1826)     Results for orders placed or performed during the hospital encounter of 06/23/17   US Lower Extremity Venous Duplex Bilateral    Narrative    US LOWER EXTREMITY VENOUS DUPLEX BILATERAL   6/23/2017 5:53 PM     HISTORY: Lower leg swelling status post vaginal delivery.    COMPARISON: None.    FINDINGS: Venous Dopplers with waveform spectral analysis of both  lower extremities. Exam is normal. No evidence for deep venous  thrombosis on either side.      Impression    IMPRESSION: Negative.    NERY MANZANO MD   Chest CT - IV contrast only - PE protocol    Narrative    CT CHEST PULMONARY EMBOLISM W CONTRAST 6/23/2017 6:35 PM    HISTORY:  Shortness of breath.      TECHNIQUE: 80 mL Isovue 370. Axial images with coronal  reconstructions. Radiation dose for this scan was reduced using  automated exposure control, adjustment of the mA and/or kV according  to patient size, or iterative reconstruction technique.    COMPARISON:  None.    FINDINGS:  The lungs are clear. Mediastinal and hilar structures are  within normal limits. No significant adenopathy. Small right pleural  effusion with atelectasis.    Pulmonary arteries are well-opacified. No CT evidence for acute  pulmonary embolus. Beam hardening artifact appears to explain any  subtle filling defects.      Impression    IMPRESSION:    1. No CT evidence for acute ulnar embolus.  2. Very small right pleural effusion with atelectasis.    MUNA CALVILLO MD         Assessments & Plan (with Medical Decision Making)labs were obtained along with a B venous doppler US. White count normal with a hgb of 9.0 which is improved. There was no L shift. Basic metabolic panel was remarkable for a glucose of 68. Patient was given food. UA with large amount of blood with trace leukocyte and 22 RBC's.small amount of protein.  Culture sent but patient is on macrobid. Venous doppler US was negative. A ddimer was elevated and due to patients chest pain a CT PE protocol was obtained and was unremarkable accept a small amount of atelectasis /small pleural effusion on the R side. Patient was informed of the findings. Case was reviewed with Dr. Callahan with OB. HE recommended  starting the patient on lasix for a short course and having the patient monitor for post delivery eclampsia.She should follow up with her OB on Monday for a recheck.  Risks were discussed with patient and she will monitor closely.      I have reviewed the nursing notes.    I have reviewed the findings, diagnosis, plan and need for follow up with the patient.       Discharge Medication List as of 6/23/2017  7:36 PM      START taking these medications    Details   furosemide (LASIX) 20 MG tablet Take 1 tablet (20 mg) by mouth 2 times daily, Disp-8 tablet, R-0, Local Print             Final diagnoses:   Edema of both legs - s/p vaginal delivery 4 days ago   Chest wall pain       6/23/2017   [facility] EMERGENCY DEPARTMENT     Jairo Saleh MD  06/25/17 4110

## 2017-06-23 NOTE — TELEPHONE ENCOUNTER
Called pt and advised to be seen in urgent care today.  Pt agrees with this plan.    Argentina Parry  [facility] RN

## 2017-06-23 NOTE — TELEPHONE ENCOUNTER
S-(situation): Pt reports that she has +4 pitting edema bilaterally from knees on down, feel tight and painful to walk.  Pt questions if can get rx for diuretic?    B-(background): Postpartum 06-19-17, had preeclampsia after delivering her first child and was given diuretic and symptoms resolved.     A-(assessment): Pt reports she had a headache this morning and took Ibuprofen and Tylenol and headache resolved.  No change in vision.  Believes headache is from little sleep.    R-(recommendations): Advised pt that Dr. Lantigua is out of the office today and will send this concern to Dr. Callahan who is on call.      Argentina Parry  [facility] RN

## 2017-06-23 NOTE — ED AVS SNAPSHOT
[facility]    [address] [address]    Phone:  [phone]    Fax:  [phone]                                       Jennifer A Schwab   MRN: [id number]    Department:  [facility]   Date of Visit:  6/23/2017           After Visit Summary Signature Page     I have received my discharge instructions, and my questions have been answered. I have discussed any challenges I see with this plan with the nurse or doctor.    ..........................................................................................................................................  Patient/Patient Representative Signature      ..........................................................................................................................................  Patient Representative Print Name and Relationship to Patient    ..................................................               ................................................  Date                                            Time    ..........................................................................................................................................  Reviewed by Signature/Title    ...................................................              ..............................................  Date                                                            Time

## 2017-06-23 NOTE — ED NOTES
"Pt here with multiple issue, post delivery  6/20, rib pain, \"difficult to take a deep breath\", swelling legs, exhausted, poor sleep, flow normal. Awaiting MD Bueno, \"who will come see \", also has a headache  "

## 2017-06-23 NOTE — ED AVS SNAPSHOT
[facility] Emergency Department    [address] [facility] [address]    Phone:  [phone]    Fax:  [phone]                                       Jennifer A Schwab   MRN: [id number]    Department:  [facility] Emergency Department   Date of Visit:  6/23/2017           Patient Information     Date Of Birth          1980        Your diagnoses for this visit were:     Edema of both legs s/p vaginal delivery 4 days ago       You were seen by Jairo Saleh MD.      Follow-up Information     Follow up with Janie Lantigua MD.    Specialty:  OB/Gyn    Why:  As needed    Contact information:    [facility]  [address] [place] [id number]  [phone]          Follow up with [facility] Emergency Department.    Specialty:  EMERGENCY MEDICINE    Why:  If symptoms worsen    Contact information:    [address] [place] [address] [phone]    Additional information:    The medical center is located at   [address] [place] [address] (between [facility] and   [facility] in [place], four miles north   of [place]).        Discharge Instructions       Return if symptoms worsen or new symptoms develop.  Follow-up with primary care physician next available.  If any severe headaches increasing edema high blood pressure or other symptoms present including decreased urine output please return for recheck.  Take Lasix as directed.  Leg Swelling in Both Legs    Swelling of the feet, ankles, and legs is called edema. It is caused by excess fluid that has collected in the tissues. Extra fluid in the body settles in the lowest part because of gravity. This is why the legs and feet are most affected.  Some of the causes for edema include:    Disease of the heart like congestive heart failure    Standing or sitting for long periods of time    Infection of the feet or legs    Blood pooling in the veins of your legs (venous insufficiency)    Dilated veins in your lower leg  (varicose veins)    Garters or other clothing that is tight on your legs. This will cause blood to pool in your legs because the clothing limits blood flow.    Some medicines such as hormones like birth control pills, some blood pressure medicines like calcium channel blockers (amlodipine) and steroids, some antidepressants like MAO inhibitors and tricyclics    Menstrual periods that cause you to retain fluids    Many types of renal disease    Liver failure or cirrhosis    Pregnancy, some swelling is normal, but a sudden increase in leg swelling or weight gain can be a sign of a dangerous complication of pregnancy    Poor nutrition    Thyroid disease  Medical treatment will depend on what is causing the swelling in your legs. Your healthcare provider may prescribe water pills (diuretics) to get rid of the extra fluid.  Home care  Follow these guidelines when caring for yourself at home:    Don't wear clothing like garters that is tight on your legs.    Keep your legs up while lying or sitting.    If infection, injury, or recent surgery is causing the swelling, stay off your legs as much as possible until symptoms get better.    If your healthcare provider says that your leg swelling is caused by venous insufficiency or varicose veins, don't sit or  one place for long periods of time. Take breaks and walk about every few hours. Brisk walking is a good exercise. It helps circulate the blood that has collected in your leg. Talk with your provider about using support stockings to stop daytime leg swelling.    If your provider says that heart disease is causing your leg swelling, follow a low-salt diet to stop extra fluid from staying in your body. You may also need medicine.  Follow-up care  Follow up with your healthcare provider, or as advised.  When to seek medical advice  Call your healthcare provider right away if any of these occur:    New shortness of breath or chest pain    Shortness of breath or chest  pain that gets worse    Swelling in both legs or ankles that gets worse    Swelling of the abdomen    Redness, warmth, or swelling in one leg    Fever of 100.4 F (38 C) or higher, or as directed by your healthcare provider    Yellow color to your skin or eyes    Rapid, unexplained weight gain    Having to sleep upright or use an increased number of pillows  Date Last Reviewed: 3/31/2016    7678-8841 [employer]. [address] [place] [address], [place] [address]. All rights reserved. This information is not intended as a substitute for professional medical care. Always follow your healthcare professional's instructions.          Discharge References/Attachments     PREECLAMPSIA, UNDERSTANDING (ENGLISH)      Future Appointments        Provider Department Dept Phone Center    8/1/2017 10:00 AM Janie Lantigua MD [facility] [phone] [facility]      24 Hour Appointment Hotline       To make an appointment at any [facility], call [phone] ([phone]). If you don't have a family doctor or clinic, we will help you find one. [facility] are conveniently located to serve the needs of you and your family.             Review of your medicines      START taking        Dose / Directions Last dose taken    furosemide 20 MG tablet   Commonly known as:  LASIX   Dose:  20 mg   Quantity:  8 tablet        Take 1 tablet (20 mg) by mouth 2 times daily   Refills:  0          Our records show that you are taking the medicines listed below. If these are incorrect, please call your family doctor or clinic.        Dose / Directions Last dose taken    cetirizine 10 MG tablet   Commonly known as:  zyrTEC   Dose:  10 mg        Take 10 mg by mouth every evening   Refills:  0        ibuprofen 800 MG tablet   Commonly known as:  ADVIL/MOTRIN   Dose:  800 mg   Quantity:  90 tablet        Take 1 tablet (800 mg) by mouth every 8 hours as needed for moderate pain   Refills:  1        nitrofurantoin  (macrocrystal-monohydrate) 100 MG capsule   Commonly known as:  MACROBID   Dose:  100 mg   Quantity:  14 capsule        Take 1 capsule (100 mg) by mouth 2 times daily   Refills:  0        PREVACID PO   Dose:  15 mg        Take 15 mg by mouth every morning (before breakfast)   Refills:  0                Prescriptions were sent or printed at these locations (1 Prescription)                   [id number] - [place], [place] - [address] [facility] [address] [facility] [address] [address], [place] [address]    Telephone:  [phone]   Fax:  [phone]   Hours:                  Printed at Department/Unit printer (1 of 1)         furosemide (LASIX) 20 MG tablet                Procedures and tests performed during your visit     Basic metabolic panel    CBC with platelets, differential    Chest CT - IV contrast only - PE protocol    D dimer quantitative    Strict intake and output    UA reflex to Microscopic    US Lower Extremity Venous Duplex Bilateral      Orders Needing Specimen Collection     None      Pending Results     No orders found from 6/21/2017 to 6/24/2017.            Pending Culture Results     No orders found from 6/21/2017 to 6/24/2017.            Pending Results Instructions     If you had any lab results that were not finalized at the time of your Discharge, you can call the ED Lab Result RN at [phone]. You will be contacted by this team for any positive Lab results or changes in treatment. The nurses are available 7 days a week from 10A to 6:30P.  You can leave a message 24 hours per day and they will return your call.        Test Results From Your Hospital Stay        6/23/2017  5:21 PM      Component Results     Component Value Ref Range & Units Status    WBC 10.0 4.0 - 11.0 10e9/L Final    RBC Count 3.39 (L) 3.8 - 5.2 10e12/L Final    Hemoglobin 9.0 (L) 11.7 - 15.7 g/dL Final    Hematocrit 28.7 (L) 35.0 - 47.0 % Final    MCV 85 78 - 100 fl Final    MCH 26.5 26.5 -  33.0 pg Final    MCHC 31.4 (L) 31.5 - 36.5 g/dL Final    RDW 13.3 10.0 - 15.0 % Final    Platelet Count 350 150 - 450 10e9/L Final    Diff Method Automated Method  Final    % Neutrophils 60.8 % Final    % Lymphocytes 25.6 % Final    % Monocytes 8.0 % Final    % Eosinophils 4.6 % Final    % Basophils 0.4 % Final    % Immature Granulocytes 0.6 % Final    Absolute Neutrophil 6.0 1.6 - 8.3 10e9/L Final    Absolute Lymphocytes 2.6 0.8 - 5.3 10e9/L Final    Absolute Monocytes 0.8 0.0 - 1.3 10e9/L Final    Absolute Eosinophils 0.5 0.0 - 0.7 10e9/L Final    Absolute Basophils 0.0 0.0 - 0.2 10e9/L Final    Abs Immature Granulocytes 0.1 0 - 0.4 10e9/L Final         6/23/2017  5:40 PM      Component Results     Component Value Ref Range & Units Status    Sodium 142 133 - 144 mmol/L Final    Potassium 3.8 3.4 - 5.3 mmol/L Final    Chloride 109 94 - 109 mmol/L Final    Carbon Dioxide 24 20 - 32 mmol/L Final    Anion Gap 9 3 - 14 mmol/L Final    Glucose 68 (L) 70 - 99 mg/dL Final    Urea Nitrogen 7 7 - 30 mg/dL Final    Creatinine 0.62 0.52 - 1.04 mg/dL Final    GFR Estimate >90  Non  GFR Calc   >60 mL/min/1.7m2 Final    GFR Estimate If Black >90   GFR Calc   >60 mL/min/1.7m2 Final    Calcium 8.9 8.5 - 10.1 mg/dL Final         6/23/2017  5:46 PM      Component Results     Component Value Ref Range & Units Status    Color Urine Yellow  Final    Appearance Urine Clear  Final    Glucose Urine Negative NEG mg/dL Final    Bilirubin Urine Negative NEG Final    Ketones Urine Negative NEG mg/dL Final    Specific Gravity Urine 1.008 1.003 - 1.035 Final    Blood Urine Moderate (A) NEG Final    pH Urine 7.5 (H) 5.0 - 7.0 pH Final    Protein Albumin Urine 10 (A) NEG mg/dL Final    Urobilinogen mg/dL Normal 0.0 - 2.0 mg/dL Final    Nitrite Urine Negative NEG Final    Leukocyte Esterase Urine Small (A) NEG Final    Source Midstream Urine  Final    RBC Urine >182 (H) 0 - 2 /HPF Final    WBC Urine 22 (H) 0 - 2  /HPF Final    Squamous Epithelial /HPF Urine 1 0 - 1 /HPF Final    Mucous Urine Present (A) NEG /LPF Final         6/23/2017  6:01 PM      Narrative     US LOWER EXTREMITY VENOUS DUPLEX BILATERAL  6/23/2017 5:53 PM     HISTORY: Lower leg swelling status post vaginal delivery.    COMPARISON: None.    FINDINGS: Venous Dopplers with waveform spectral analysis of both  lower extremities. Exam is normal. No evidence for deep venous  thrombosis on either side.        Impression     IMPRESSION: Negative.    NERY MANZANO MD         6/23/2017  5:59 PM      Component Results     Component Value Ref Range & Units Status    D Dimer 1.4 (H) 0.0 - 0.50 ug/ml FEU Final    This D-dimer assay is intended for use in conjuntion with a clinical pretest   probability assessment model to exclude pulmonary embolism (PE) and as an aid   in the diagnosis of deep venous thrombosis (DVT) in outpatients suspected of   PE   or DVT. The cut-off value is 0.5 g/mL FEU.           6/23/2017  6:45 PM      Narrative     CT CHEST PULMONARY EMBOLISM W CONTRAST 6/23/2017 6:35 PM    HISTORY:  Shortness of breath.      TECHNIQUE: 80 mL Isovue 370. Axial images with coronal  reconstructions. Radiation dose for this scan was reduced using  automated exposure control, adjustment of the mA and/or kV according  to patient size, or iterative reconstruction technique.    COMPARISON:  None.    FINDINGS:  The lungs are clear. Mediastinal and hilar structures are  within normal limits. No significant adenopathy. Small right pleural  effusion with atelectasis.    Pulmonary arteries are well-opacified. No CT evidence for acute  pulmonary embolus. Beam hardening artifact appears to explain any  subtle filling defects.        Impression     IMPRESSION:    1. No CT evidence for acute ulnar embolus.  2. Very small right pleural effusion with atelectasis.    MUNA CALVILLO MD                Thank you for choosing [facility]       Thank you for choosing [facility] for your  care. Our goal is always to provide you with excellent care. Hearing back from our patients is one way we can continue to improve our services. Please take a few minutes to complete the written survey that you may receive in the mail after you visit with us. Thank you!        [employer] Information     [employer] gives you secure access to your electronic health record. If you see a primary care provider, you can also send messages to your care team and make appointments. If you have questions, please call your primary care clinic.  If you do not have a primary care provider, please call [phone] and they will assist you.        Care EveryWhere ID     This is your Care EveryWhere ID. This could be used by other organizations to access your [facility] medical records  [id number]        Equal Access to Services     OLIVIER VILLA : Bianka Nye, chayo ojeda, reilly connor, oanh hutchins. So [facility] [phone].    ATENCIÓN: Si habla español, tiene a allen disposición servicios gratuitos de asistencia lingüística. Llame al [phone].    We comply with applicable federal civil rights laws and [place] laws. We do not discriminate on the basis of race, color, national origin, age, disability sex, sexual orientation or gender identity.            After Visit Summary       This is your record. Keep this with you and show to your community pharmacist(s) and doctor(s) at your next visit.

## 2017-06-24 NOTE — DISCHARGE INSTRUCTIONS
Return if symptoms worsen or new symptoms develop.  Follow-up with primary care physician next available.  If any severe headaches increasing edema high blood pressure or other symptoms present including decreased urine output please return for recheck.  Take Lasix as directed.  Leg Swelling in Both Legs    Swelling of the feet, ankles, and legs is called edema. It is caused by excess fluid that has collected in the tissues. Extra fluid in the body settles in the lowest part because of gravity. This is why the legs and feet are most affected.  Some of the causes for edema include:    Disease of the heart like congestive heart failure    Standing or sitting for long periods of time    Infection of the feet or legs    Blood pooling in the veins of your legs (venous insufficiency)    Dilated veins in your lower leg (varicose veins)    Garters or other clothing that is tight on your legs. This will cause blood to pool in your legs because the clothing limits blood flow.    Some medicines such as hormones like birth control pills, some blood pressure medicines like calcium channel blockers (amlodipine) and steroids, some antidepressants like MAO inhibitors and tricyclics    Menstrual periods that cause you to retain fluids    Many types of renal disease    Liver failure or cirrhosis    Pregnancy, some swelling is normal, but a sudden increase in leg swelling or weight gain can be a sign of a dangerous complication of pregnancy    Poor nutrition    Thyroid disease  Medical treatment will depend on what is causing the swelling in your legs. Your healthcare provider may prescribe water pills (diuretics) to get rid of the extra fluid.  Home care  Follow these guidelines when caring for yourself at home:    Don't wear clothing like garters that is tight on your legs.    Keep your legs up while lying or sitting.    If infection, injury, or recent surgery is causing the swelling, stay off your legs as much as possible until  symptoms get better.    If your healthcare provider says that your leg swelling is caused by venous insufficiency or varicose veins, don't sit or  one place for long periods of time. Take breaks and walk about every few hours. Brisk walking is a good exercise. It helps circulate the blood that has collected in your leg. Talk with your provider about using support stockings to stop daytime leg swelling.    If your provider says that heart disease is causing your leg swelling, follow a low-salt diet to stop extra fluid from staying in your body. You may also need medicine.  Follow-up care  Follow up with your healthcare provider, or as advised.  When to seek medical advice  Call your healthcare provider right away if any of these occur:    New shortness of breath or chest pain    Shortness of breath or chest pain that gets worse    Swelling in both legs or ankles that gets worse    Swelling of the abdomen    Redness, warmth, or swelling in one leg    Fever of 100.4 F (38 C) or higher, or as directed by your healthcare provider    Yellow color to your skin or eyes    Rapid, unexplained weight gain    Having to sleep upright or use an increased number of pillows  Date Last Reviewed: 3/31/2016    5134-7255 [employer]. [address] [place] [address], [place] [address]. All rights reserved. This information is not intended as a substitute for professional medical care. Always follow your healthcare professional's instructions.

## 2017-06-25 LAB
BACTERIA SPEC CULT: NORMAL
MICRO REPORT STATUS: NORMAL
SPECIMEN SOURCE: NORMAL

## 2017-06-25 ASSESSMENT — ENCOUNTER SYMPTOMS
FEVER: 0
DIZZINESS: 0
NECK PAIN: 0
WEAKNESS: 0
DYSURIA: 0
BRUISES/BLEEDS EASILY: 0
VOMITING: 0
CHEST TIGHTNESS: 0
MYALGIAS: 1
LIGHT-HEADEDNESS: 0
ARTHRALGIAS: 1
HEADACHES: 0
PALPITATIONS: 0
SORE THROAT: 0
ACTIVITY CHANGE: 1
CONFUSION: 0
BACK PAIN: 0
COUGH: 0
APPETITE CHANGE: 1
NAUSEA: 1
ABDOMINAL PAIN: 0
CHILLS: 0
TROUBLE SWALLOWING: 0
SHORTNESS OF BREATH: 1

## 2017-08-01 PROBLEM — R10.2 PELVIC PRESSURE IN PREGNANCY: Status: RESOLVED | Noted: 2017-06-17 | Resolved: 2017-08-01

## 2017-08-01 PROBLEM — M79.89 PAIN AND SWELLING OF LEFT LOWER EXTREMITY: Status: RESOLVED | Noted: 2017-05-30 | Resolved: 2017-08-01

## 2017-08-01 PROBLEM — O42.90 ROM (RUPTURE OF MEMBRANES), PREMATURE: Status: RESOLVED | Noted: 2017-06-19 | Resolved: 2017-08-01

## 2017-08-01 PROBLEM — O26.899 PELVIC PRESSURE IN PREGNANCY: Status: RESOLVED | Noted: 2017-06-17 | Resolved: 2017-08-01

## 2017-08-01 PROBLEM — M79.605 PAIN AND SWELLING OF LEFT LOWER EXTREMITY: Status: RESOLVED | Noted: 2017-05-30 | Resolved: 2017-08-01

## 2018-01-07 ENCOUNTER — HEALTH MAINTENANCE LETTER (OUTPATIENT)
Age: 38
End: 2018-01-07

## 2018-05-09 ENCOUNTER — TELEPHONE (OUTPATIENT)
Dept: OBGYN | Facility: CLINIC | Age: 38
End: 2018-05-09

## 2018-05-09 NOTE — TELEPHONE ENCOUNTER
Pt is past due for f/u pap smear.  Reminder letter was sent 10/26/17.  LMTC and schedule at [facility].  Left this writer's number in case of questions ([phone]).  If no reply and/or appt within 2 weeks (05/23/18) pt will be considered lost to pap tracking f/u.  Lily Portillo,    Pap Tracking

## 2018-07-27 ENCOUNTER — TELEPHONE (OUTPATIENT)
Dept: OBGYN | Facility: CLINIC | Age: 38
End: 2018-07-27

## 2018-07-27 NOTE — TELEPHONE ENCOUNTER
"Return call to patient.  Spoke with patient on the phone.  Patient would like appointment for follow up on abnormal PAP due to positive \" other HPV\"  Patient offered next available with Dr. Padilla or Dr. Lantigua.  Patient prefers sooner appointment.   Offered FP and scheduled on 8/13/18.    Pt in agreement and reports understanding.    Chandni Hair   [facility]  RN    "

## 2018-07-27 NOTE — TELEPHONE ENCOUNTER
Reason for call:  Patient reporting a symptom    Symptom or request: delivered 1 year - never went back in for follow up.  Didn't have any issues.  When she was pregnant had abnormal pap and HPV.  Was told to come back in for bx or further testing.  Thinks she is having SX of cervical ca showing - not sure wants to discuss this.  Assuming she needs to come in  - wondering how urgent is this?    Duration (how long have symptoms been present):     Have you been treated for this before? No    Additional comments:     Phone Number patient can be reached at:  Work number on file:  [phone] (work)    Best Time:  any    Can we leave a detailed message on this number:  YES    Call taken on 7/27/2018 at 8:18 AM by Malathi Robledo

## 2018-08-08 ENCOUNTER — RECORDS - HEALTHEAST (OUTPATIENT)
Dept: LAB | Facility: CLINIC | Age: 38
End: 2018-08-08

## 2018-08-08 LAB
ALBUMIN SERPL-MCNC: 3.9 G/DL (ref 3.5–5)
ALP SERPL-CCNC: 87 U/L (ref 45–120)
ALT SERPL W P-5'-P-CCNC: 17 U/L (ref 0–45)
ANION GAP SERPL CALCULATED.3IONS-SCNC: 6 MMOL/L (ref 5–18)
AST SERPL W P-5'-P-CCNC: 16 U/L (ref 0–40)
BILIRUB SERPL-MCNC: 0.3 MG/DL (ref 0–1)
BUN SERPL-MCNC: 11 MG/DL (ref 8–22)
C REACTIVE PROTEIN LHE: 0.1 MG/DL (ref 0–0.8)
CALCIUM SERPL-MCNC: 9.4 MG/DL (ref 8.5–10.5)
CHLORIDE BLD-SCNC: 107 MMOL/L (ref 98–107)
CO2 SERPL-SCNC: 26 MMOL/L (ref 22–31)
CREAT SERPL-MCNC: 0.74 MG/DL (ref 0.6–1.1)
GFR SERPL CREATININE-BSD FRML MDRD: >60 ML/MIN/1.73M2
GLUCOSE BLD-MCNC: 88 MG/DL (ref 70–125)
POTASSIUM BLD-SCNC: 3.9 MMOL/L (ref 3.5–5)
PROT SERPL-MCNC: 6.9 G/DL (ref 6–8)
SODIUM SERPL-SCNC: 139 MMOL/L (ref 136–145)

## 2018-08-09 LAB
C TRACH DNA SPEC QL PROBE+SIG AMP: NEGATIVE
N GONORRHOEA DNA SPEC QL NAA+PROBE: NEGATIVE

## 2018-08-10 ENCOUNTER — RECORDS - HEALTHEAST (OUTPATIENT)
Dept: ADMINISTRATIVE | Facility: OTHER | Age: 38
End: 2018-08-10

## 2018-08-10 LAB
BKR LAB AP ABNORMAL BLEEDING: YES
BKR LAB AP BIRTH CONTROL/HORMONES: NORMAL
BKR LAB AP CERVICAL APPEARANCE: NORMAL
BKR LAB AP GYN ADEQUACY: NORMAL
BKR LAB AP GYN INTERPRETATION: NORMAL
BKR LAB AP HPV REFLEX: NORMAL
BKR LAB AP LMP: NORMAL
BKR LAB AP PATIENT STATUS: NO
BKR LAB AP PREVIOUS ABNORMAL: NORMAL
BKR LAB AP PREVIOUS NORMAL: NORMAL
HIGH RISK?: YES
PATH REPORT.COMMENTS IMP SPEC: NORMAL
RESULT FLAG (HE HISTORICAL CONVERSION): NORMAL

## 2018-10-02 ASSESSMENT — MIFFLIN-ST. JEOR: SCORE: 1330.86

## 2018-10-04 ENCOUNTER — ANESTHESIA - HEALTHEAST (OUTPATIENT)
Dept: SURGERY | Facility: AMBULATORY SURGERY CENTER | Age: 38
End: 2018-10-04

## 2018-10-05 ENCOUNTER — SURGERY - HEALTHEAST (OUTPATIENT)
Dept: SURGERY | Facility: AMBULATORY SURGERY CENTER | Age: 38
End: 2018-10-05

## 2018-10-05 ASSESSMENT — MIFFLIN-ST. JEOR: SCORE: 1330.86

## 2019-04-03 ENCOUNTER — HOSPITAL ENCOUNTER (OUTPATIENT)
Dept: ULTRASOUND IMAGING | Facility: CLINIC | Age: 39
Discharge: HOME | End: 2019-04-03
Attending: INTERNAL MEDICINE | Admitting: INTERNAL MEDICINE
Payer: COMMERCIAL

## 2019-04-03 DIAGNOSIS — K27.9 PUD (PEPTIC ULCER DISEASE): ICD-10-CM

## 2019-04-03 PROCEDURE — 76705 ECHO EXAM OF ABDOMEN: CPT

## 2020-03-10 ENCOUNTER — HEALTH MAINTENANCE LETTER (OUTPATIENT)
Age: 40
End: 2020-03-10

## 2020-11-20 ENCOUNTER — AMBULATORY - HEALTHEAST (OUTPATIENT)
Dept: SURGERY | Facility: AMBULATORY SURGERY CENTER | Age: 40
End: 2020-11-20

## 2020-11-20 DIAGNOSIS — Z11.59 ENCOUNTER FOR SCREENING FOR OTHER VIRAL DISEASES: ICD-10-CM

## 2020-12-05 ENCOUNTER — AMBULATORY - HEALTHEAST (OUTPATIENT)
Dept: FAMILY MEDICINE | Facility: CLINIC | Age: 40
End: 2020-12-05

## 2020-12-05 DIAGNOSIS — Z11.59 ENCOUNTER FOR SCREENING FOR OTHER VIRAL DISEASES: ICD-10-CM

## 2020-12-06 ENCOUNTER — COMMUNICATION - HEALTHEAST (OUTPATIENT)
Dept: SCHEDULING | Facility: CLINIC | Age: 40
End: 2020-12-06

## 2020-12-06 LAB
SARS-COV-2 PCR COMMENT: NORMAL
SARS-COV-2 RNA SPEC QL NAA+PROBE: NEGATIVE
SARS-COV-2 VIRUS SPECIMEN SOURCE: NORMAL

## 2020-12-08 ENCOUNTER — ANESTHESIA - HEALTHEAST (OUTPATIENT)
Dept: SURGERY | Facility: AMBULATORY SURGERY CENTER | Age: 40
End: 2020-12-08

## 2020-12-08 ASSESSMENT — MIFFLIN-ST. JEOR: SCORE: 1394.36

## 2020-12-09 ENCOUNTER — SURGERY - HEALTHEAST (OUTPATIENT)
Dept: SURGERY | Facility: AMBULATORY SURGERY CENTER | Age: 40
End: 2020-12-09

## 2020-12-09 ASSESSMENT — MIFFLIN-ST. JEOR: SCORE: 1394.36

## 2020-12-27 ENCOUNTER — HEALTH MAINTENANCE LETTER (OUTPATIENT)
Age: 40
End: 2020-12-27

## 2021-04-24 ENCOUNTER — HEALTH MAINTENANCE LETTER (OUTPATIENT)
Age: 41
End: 2021-04-24

## 2021-05-29 ENCOUNTER — RECORDS - HEALTHEAST (OUTPATIENT)
Dept: ADMINISTRATIVE | Facility: CLINIC | Age: 41
End: 2021-05-29

## 2021-05-30 VITALS — WEIGHT: 175 LBS | BODY MASS INDEX: 29.88 KG/M2 | HEIGHT: 64 IN

## 2021-06-02 ENCOUNTER — RECORDS - HEALTHEAST (OUTPATIENT)
Dept: ADMINISTRATIVE | Facility: CLINIC | Age: 41
End: 2021-06-02

## 2021-06-02 VITALS — WEIGHT: 156 LBS | HEIGHT: 62 IN | BODY MASS INDEX: 28.71 KG/M2

## 2021-06-05 VITALS — HEIGHT: 62 IN | WEIGHT: 170 LBS | BODY MASS INDEX: 31.28 KG/M2

## 2021-06-08 NOTE — PROGRESS NOTES
Patient arrived via the emergency room due to having upper left abdominal pain and nausea. She states that the pain has been going on and off for the last couple of weeks and has lost weight due to her nausea, but the nausea is different from morning sickness. The left sided pain she rates a 10/10 and wraps around into her back and slightly decreases when she raises her arm, it also is much worse at night. She occasional feels a sharp pain on her right side in her ribs that makes her unable to take a deep breath. Patient is reporting that she has not drank any fluids today and feels dehydrated. IV placed and fluid bolus started. Urine sent to lab for UA. Dr. Johns on the unit and updated, at 1815 in room to evaluate patient.

## 2021-06-08 NOTE — PROGRESS NOTES
OBSTETRICS TRIAGE ASSESSMENT NOTE  Sheri Ring is a 36 y.o.  at 20w4d gestation who has presented to maternity care for further evaluation of abdominal/back pain. Pain is located on her left anterior chest wall under her breast and wraps around to her left flank. Pain started about 2 weeks ago and has been getting worse. Pain worse with taking a deep breath, movement-twisting at torso, and laying down at night. No injury or trauma that she can recall. Has had intermittent left leg swelling over the past 2 weeks, no redness or warmth to the leg. No history of blood clots. Did Fly to [place] last week. Has also been feeling increased nausea and intake has been decreased. She called her OB clinic about this and was told to go in to the hospital to be evaluated. Denies fever, chills, chest pain, vomiting, diarrhea, constipation, dysuria.     No bleeding, leakage of fluids, contractions. Baby is moving normally.     PRENATAL CARE  Seen by [facility]   OB History      Para Term  AB TAB SAB Ectopic Multiple Living    3 2        2          PAST MEDICAL HISTORY  Past Medical History:   Diagnosis Date     GERD (gastroesophageal reflux disease)      Intussusception        PAST SURGICAL HISTORY   Past Surgical History:   Procedure Laterality Date     INTUSSUSCEPTION REPAIR         MEDICATIONS    Current Facility-Administered Medications:      lactated ringers, 0-500 mL/hr, Intravenous, Continuous, Aiden Pierce MD, Last Rate: 500 mL/hr at 02/10/17 180, 500 mL/hr at 02/10/17 180    SOCIAL HISTORY:   Social History     Social History     Marital status:      Spouse name: N/A     Number of children: N/A     Years of education: N/A     Occupational History     Not on file.     Social History Main Topics     Smoking status: Current Some Day Smoker     Smokeless tobacco: Not on file     Alcohol use No     Drug use: No     Sexual activity: Not on file     Other Topics Concern     Not on  "file     Social History Narrative    09/12/2016 - Patient is recently .       PHYSICAL EXAMINATION   Visit Vitals     /86     Pulse 86     Resp 16     Ht 5' 4\" (1.626 m)     Wt 175 lb (79.4 kg)     LMP 08/29/2016     BMI 30.04 kg/m2     Gen: appears comfortable in no acute distress  HEENT: PERRL, no icterus  CV: regular rate and rhythm without murmur  Lungs: clear to ausculation, good air movement throughout  Chest wall: tender to palpation along chest wall under left breast  Back: tender over left flank  Abdomen: Gravid, non-tender  Extremities: no lower extremity edema      LAB RESULTS  Personally reviewed.  Recent Results (from the past 24 hour(s))   Urinalysis-UC if Indicated    Collection Time: 02/10/17  5:30 PM   Result Value Ref Range    Color, UA Yellow Colorless, Yellow, Straw, Light Yellow    Clarity, UA Cloudy (!) Clear    Glucose, UA Negative Negative    Bilirubin, UA Negative Negative    Ketones, UA Trace (!) Negative, 60 mg/dL    Specific Gravity, UA 1.028 1.001 - 1.030    Blood, UA Trace (!) Negative    pH, UA 6.0 4.5 - 8.0    Protein, UA 30 mg/dL (!) Negative mg/dL    Urobilinogen, UA 2.0 E.U./dL <2.0 E.U./dL, 2.0 E.U./dL    Nitrite, UA Negative Negative    Leukocytes, UA Trace (!) Negative    Bacteria, UA None Seen None Seen hpf    RBC, UA 0-2 None Seen, 0-2 hpf    WBC, UA 0-5 None Seen, 0-5 hpf    Squam Epithel, UA >100 (!) None Seen, 0-5 lpf    Mucus, UA Many (!) None Seen lpf   Comprehensive Metabolic Panel    Collection Time: 02/10/17  7:20 PM   Result Value Ref Range    Sodium 138 136 - 145 mmol/L    Potassium 3.6 3.5 - 5.0 mmol/L    Chloride 110 (H) 98 - 107 mmol/L    CO2 22 22 - 31 mmol/L    Anion Gap, Calculation 6 5 - 18 mmol/L    Glucose 84 70 - 125 mg/dL    BUN 6 (L) 8 - 22 mg/dL    Creatinine 0.59 (L) 0.60 - 1.10 mg/dL    GFR MDRD Af Amer >60 >60 mL/min/1.73m2    GFR MDRD Non Af Amer >60 >60 mL/min/1.73m2    Bilirubin, Total 0.2 0.0 - 1.0 mg/dL    Calcium 8.6 8.5 - 10.5 " mg/dL    Protein, Total 6.0 6.0 - 8.0 g/dL    Albumin 2.9 (L) 3.5 - 5.0 g/dL    Alkaline Phosphatase 65 45 - 120 U/L    AST 10 0 - 40 U/L    ALT 7 0 - 45 U/L   Lipase    Collection Time: 02/10/17  7:20 PM   Result Value Ref Range    Lipase 25 0 - 52 U/L   C-Reactive Protein(CRP)    Collection Time: 02/10/17  7:20 PM   Result Value Ref Range    CRP 0.6 0.0 - 0.8 mg/dL   HM1 (CBC with Diff)    Collection Time: 02/10/17  7:20 PM   Result Value Ref Range    WBC 13.6 (H) 4.0 - 11.0 thou/uL    RBC 3.43 (L) 3.80 - 5.40 mill/uL    Hemoglobin 10.9 (L) 12.0 - 16.0 g/dL    Hematocrit 31.4 (L) 35.0 - 47.0 %    MCV 92 80 - 100 fL    MCH 31.7 27.0 - 34.0 pg    MCHC 34.6 32.0 - 36.0 g/dL    RDW 13.1 11.0 - 14.5 %    Platelets 261 140 - 440 thou/uL    MPV 8.2 7.0 - 10.0 fL    Neutrophils % 66 50 - 70 %    Lymphocytes % 24 20 - 40 %    Monocytes % 8 2 - 10 %    Eosinophils % 2 0 - 6 %    Basophils % 0 0 - 2 %    Neutrophils Absolute 9.0 (H) 2.0 - 7.7 thou/uL    Lymphocytes Absolute 3.2 0.8 - 4.4 thou/uL    Monocytes Absolute 1.0 (H) 0.0 - 0.9 thou/uL    Eosinophils Absolute 0.3 0.0 - 0.4 thou/uL    Basophils Absolute 0.0 0.0 - 0.2 thou/uL       ASSESSMENT/PLAN:   36 y.o.  at 20w4d gestation presenting to labor & delivery for left anterior chest wall and left flank pain. Pain is worse with movement and palpation. Feel this is most likely musculoskeletal pain likely from muscle strain. UA with trace blood, will check renal U/S to help rule out kidney stone as a cause of pain though feel this is unlikely. Does report intermittent left leg swelling and had recent flight so will rule out DVT with LE U/S. Will check CBC, LFTs, and lipase as well to rule out GI causes. If work-up normal will plan to discharge home with Tylenol and hydroxyzine to help with pain. Heating pad PRN. May benefit from physical therapy.     Addendum:   - work up negative for any significant findings, will treat as musculoskeletal pain as mentioned above  -  follow up closely with Primary OB this next week    Dallas Johns MD R3  [facility] Medicine Resident      This chart is completed utilizing dictation software; typos and/or incorrect word substitutions may unintentionally occur.

## 2021-06-13 NOTE — ANESTHESIA CARE TRANSFER NOTE
Last vitals:   Vitals:    12/09/20 1133   BP: 135/81   Pulse: 98   Resp: 16   Temp: 36.2  C (97.2  F)   SpO2: 100%     Patient's level of consciousness is drowsy  Spontaneous respirations: yes  Maintains airway independently: yes  Dentition unchanged: yes  Oropharynx: oropharynx clear of all foreign objects    QCDR Measures:  ASA# 20 - Surgical Safety Checklist: WHO surgical safety checklist completed prior to induction    PQRS# 430 - Adult PONV Prevention: 4558F - Pt received => 2 anti-emetic agents (different classes) preop & intraop  ASA# 8 - Peds PONV Prevention: NA - Not pediatric patient, not GA or 2 or more risk factors NOT present  PQRS# 424 - Christine-op Temp Management: 4559F - At least one body temp DOCUMENTED => 35.5C or 95.9F within required timeframe  PQRS# 426 - PACU Transfer Protocol: - Transfer of care checklist used  ASA# 14 - Acute Post-op Pain: ASA14B - Patient did NOT experience pain >= 7 out of 10

## 2021-06-13 NOTE — ANESTHESIA PREPROCEDURE EVALUATION
Anesthesia Evaluation      Patient summary reviewed   History of anesthetic complications (ponv)     Airway   Mallampati: II  Neck ROM: full   Pulmonary - negative ROS and normal exam   (+) a smoker                         Cardiovascular - negative ROS and normal exam  Exercise tolerance: > or = 4 METS   Neuro/Psych - negative ROS     Comments: Migraines      Endo/Other - negative ROS   (+) obesity (bmi 31),      GI/Hepatic/Renal - negative ROS   (+) GERD well controlled and intermittent,             Dental - normal exam                        Anesthesia Plan  Planned anesthetic: MAC  Versed/fentanyl/propofol  Ketamine PRN  Decadron/zofran    ASA 2   Induction: intravenous   Anesthetic plan and risks discussed with: patient  Anesthesia plan special considerations: antiemetics,   Post-op plan: routine recovery        preop labs:  Hgb 13.6  K 4.0    12/5/2020 covid pcr negative      Results for orders placed or performed during the hospital encounter of 12/09/20   POCT Pregnancy (Beta-hCG, Qual), Urine (Point of Care) on DOS   Result Value Ref Range    POC Preg, Urine Negative Negative    POCT Kit Lot Number [id number]     POCT Kit Expiration Date 2,022-2     Pos Control Valid Control Valid Control    Neg Control Valid Control Valid Control    Dipstick Lot Number      Dipstick Expiration Date      POC Specific Gravity, Urine

## 2021-06-13 NOTE — ANESTHESIA POSTPROCEDURE EVALUATION
Patient: Jennifer A Schwab  Procedure(s):  HYSTEROSCOPY, WITH DILATION AND CURETTAGE, NOVASURE ABLATION  Anesthesia type: MAC    Patient location: Phase II Recovery  Last vitals:   Vitals Value Taken Time   /88 12/09/20 1203   Temp 36.2  C (97.2  F) 12/09/20 1133   Pulse 93 12/09/20 1220   Resp 16 12/09/20 1133   SpO2 95 % 12/09/20 1220   Vitals shown include unvalidated device data.  Post vital signs: stable  Level of consciousness: awake and responds to simple questions  Post-anesthesia pain: pain controlled  Post-anesthesia nausea and vomiting: no  Pulmonary: unassisted, return to baseline  Cardiovascular: stable and blood pressure at baseline  Hydration: adequate  Anesthetic events: no    QCDR Measures:  ASA# 11 - Christine-op Cardiac Arrest: ASA11B - Patient did NOT experience unanticipated cardiac arrest  ASA# 12 - Christine-op Mortality Rate: ASA12B - Patient did NOT die  ASA# 13 - PACU Re-Intubation Rate: NA - No ETT / LMA used for case  ASA# 10 - Composite Anes Safety: ASA10A - No serious adverse event    Additional Notes:

## 2021-06-20 NOTE — ANESTHESIA PREPROCEDURE EVALUATION
Anesthesia Evaluation      Patient summary reviewed   History of anesthetic complications     Airway   Mallampati: II  Neck ROM: full   Pulmonary - normal exam                          Cardiovascular   Rhythm: regular  Rate: normal,         Neuro/Psych - negative ROS     Endo/Other       GI/Hepatic/Renal    (+) GERD,             Dental                         Anesthesia Plan  Planned anesthetic: general endotracheal  1/2 MAC Inhaled anesthetic: 1/2 MAC Propofol, Zofran, Decadron, Scopolamine  ASA 2   Induction: intravenous   Anesthetic plan and risks discussed with: patient and parent/guardian  Anesthesia plan special considerations: antiemetics,   Post-op plan: routine recovery

## 2021-06-20 NOTE — ANESTHESIA POSTPROCEDURE EVALUATION
Patient: Jennifer A Schwab  LAPAROSCOPIC TUBAL LIGATION, HYSTEROSCOPY, WITH DILATION AND CURETTAGE, HYSTEROSCOPY, WITH DILATION AND CURETTAGE  Anesthesia type: general    Patient location: Phase II Recovery  Last vitals:   Vitals:    10/05/18 1200   BP:    Pulse:    Resp: 16   Temp:    SpO2:      Post vital signs: stable  Level of consciousness: awake and responds to simple questions  Post-anesthesia pain: pain controlled  Post-anesthesia nausea and vomiting: no  Pulmonary: unassisted, return to baseline  Cardiovascular: stable and blood pressure at baseline  Hydration: adequate  Anesthetic events: no    QCDR Measures:  ASA# 11 - Christine-op Cardiac Arrest: ASA11B - Patient did NOT experience unanticipated cardiac arrest  ASA# 12 - Christine-op Mortality Rate: ASA12B - Patient did NOT die  ASA# 13 - PACU Re-Intubation Rate: ASA13B - Patient did NOT require a new airway mgmt  ASA# 10 - Composite Anes Safety: ASA10A - No serious adverse event    Additional Notes:

## 2021-06-20 NOTE — ANESTHESIA CARE TRANSFER NOTE
Last vitals:   Vitals:    10/05/18 1055   BP: 134/82   Pulse: 90   Resp: 16   Temp: 36.7  C (98.1  F)   SpO2: 100%     Volatile agents turned off, muscle relaxant reversed, 4/4 twitches with sustained tetany. Pt breathing spontaneously with adequate tidal volumes, following commands, gently suctioned oropharynx, extubated without issue. 10LPM O2 applied via face mask.Transported by CRNA and RN to recovery.      Patient's level of consciousness is awake  Spontaneous respirations: yes  Maintains airway independently: yes  Dentition unchanged: yes  Oropharynx: oropharynx clear of all foreign objects    QCDR Measures:  ASA# 20 - Surgical Safety Checklist: WHO surgical safety checklist completed prior to induction  PQRS# 430 - Adult PONV Prevention: 4558F - Pt received => 2 anti-emetic agents (different classes) preop & intraop, zofran, decadron, propofol gtt, scop patch  ASA# 8 - Peds PONV Prevention: NA - Not pediatric patient, not GA or 2 or more risk factors NOT present  PQRS# 424 - Christine-op Temp Management: 4559F - At least one body temp DOCUMENTED => 35.5C or 95.9F within required timeframe  PQRS# 426 - PACU Transfer Protocol: - Transfer of care checklist used  ASA# 14 - Acute Post-op Pain: ASA14B - Patient did NOT experience pain >= 7 out of 10

## 2021-10-09 ENCOUNTER — HEALTH MAINTENANCE LETTER (OUTPATIENT)
Age: 41
End: 2021-10-09

## 2022-05-16 ENCOUNTER — HEALTH MAINTENANCE LETTER (OUTPATIENT)
Age: 42
End: 2022-05-16

## 2022-09-11 ENCOUNTER — HEALTH MAINTENANCE LETTER (OUTPATIENT)
Age: 42
End: 2022-09-11

## 2023-06-03 ENCOUNTER — HEALTH MAINTENANCE LETTER (OUTPATIENT)
Age: 43
End: 2023-06-03

## 2023-09-06 ENCOUNTER — TRANSFERRED RECORDS (OUTPATIENT)
Dept: HEALTH INFORMATION MANAGEMENT | Facility: CLINIC | Age: 43
End: 2023-09-06
Payer: COMMERCIAL

## 2023-09-12 ENCOUNTER — TRANSFERRED RECORDS (OUTPATIENT)
Dept: HEALTH INFORMATION MANAGEMENT | Facility: CLINIC | Age: 43
End: 2023-09-12
Payer: COMMERCIAL

## 2024-02-24 ENCOUNTER — HEALTH MAINTENANCE LETTER (OUTPATIENT)
Age: 44
End: 2024-02-24

## 2024-11-19 ENCOUNTER — LAB REQUISITION (OUTPATIENT)
Dept: LAB | Facility: CLINIC | Age: 44
End: 2024-11-19
Payer: COMMERCIAL

## 2024-11-19 DIAGNOSIS — J32.8 OTHER CHRONIC SINUSITIS: ICD-10-CM

## 2024-11-21 LAB
BACTERIA SPEC CULT: ABNORMAL
BACTERIA SPEC CULT: NORMAL

## 2024-11-28 LAB — BACTERIA SPEC CULT: NORMAL

## 2024-12-05 LAB — BACTERIA SPEC CULT: NORMAL

## 2024-12-12 LAB — BACTERIA SPEC CULT: NORMAL

## 2024-12-17 LAB — BACTERIA SPEC CULT: NO GROWTH

## 2025-01-11 ENCOUNTER — HOSPITAL ENCOUNTER (EMERGENCY)
Facility: HOSPITAL | Age: 45
Discharge: HOME | End: 2025-01-11
Attending: EMERGENCY MEDICINE | Admitting: EMERGENCY MEDICINE
Payer: COMMERCIAL

## 2025-01-11 VITALS
BODY MASS INDEX: 29.63 KG/M2 | HEART RATE: 106 BPM | HEIGHT: 62 IN | SYSTOLIC BLOOD PRESSURE: 125 MMHG | TEMPERATURE: 98.2 F | RESPIRATION RATE: 20 BRPM | OXYGEN SATURATION: 97 % | DIASTOLIC BLOOD PRESSURE: 77 MMHG | WEIGHT: 161 LBS

## 2025-01-11 DIAGNOSIS — R51.9 HEADACHE: ICD-10-CM

## 2025-01-11 DIAGNOSIS — T42.3X1A: ICD-10-CM

## 2025-01-11 LAB
ALBUMIN SERPL BCG-MCNC: 4.7 G/DL (ref 3.5–5.2)
ALP SERPL-CCNC: 112 U/L (ref 40–150)
ALT SERPL W P-5'-P-CCNC: 21 U/L (ref 0–50)
ANION GAP SERPL CALCULATED.3IONS-SCNC: 10 MMOL/L (ref 7–15)
APAP SERPL-MCNC: 7.3 UG/ML (ref 10–30)
AST SERPL W P-5'-P-CCNC: 17 U/L (ref 0–45)
BILIRUB SERPL-MCNC: 0.2 MG/DL
BUN SERPL-MCNC: 5.5 MG/DL (ref 6–20)
CALCIUM SERPL-MCNC: 9.7 MG/DL (ref 8.8–10.4)
CHLORIDE SERPL-SCNC: 105 MMOL/L (ref 98–107)
CREAT SERPL-MCNC: 0.79 MG/DL (ref 0.51–0.95)
EGFRCR SERPLBLD CKD-EPI 2021: >90 ML/MIN/1.73M2
ERYTHROCYTE [DISTWIDTH] IN BLOOD BY AUTOMATED COUNT: 12.4 % (ref 10–15)
GLUCOSE SERPL-MCNC: 100 MG/DL (ref 70–99)
HCO3 SERPL-SCNC: 23 MMOL/L (ref 22–29)
HCT VFR BLD AUTO: 40.3 % (ref 35–47)
HGB BLD-MCNC: 13.4 G/DL (ref 11.7–15.7)
INR PPP: 0.9 (ref 0.85–1.15)
MCH RBC QN AUTO: 31.1 PG (ref 26.5–33)
MCHC RBC AUTO-ENTMCNC: 33.3 G/DL (ref 31.5–36.5)
MCV RBC AUTO: 94 FL (ref 78–100)
PLATELET # BLD AUTO: 372 10E3/UL (ref 150–450)
POTASSIUM SERPL-SCNC: 3.9 MMOL/L (ref 3.4–5.3)
PROT SERPL-MCNC: 7.6 G/DL (ref 6.4–8.3)
RBC # BLD AUTO: 4.31 10E6/UL (ref 3.8–5.2)
SODIUM SERPL-SCNC: 138 MMOL/L (ref 135–145)
WBC # BLD AUTO: 8 10E3/UL (ref 4–11)

## 2025-01-11 PROCEDURE — 82040 ASSAY OF SERUM ALBUMIN: CPT | Performed by: EMERGENCY MEDICINE

## 2025-01-11 PROCEDURE — 85014 HEMATOCRIT: CPT | Performed by: EMERGENCY MEDICINE

## 2025-01-11 PROCEDURE — 99284 EMERGENCY DEPT VISIT MOD MDM: CPT

## 2025-01-11 PROCEDURE — 36415 COLL VENOUS BLD VENIPUNCTURE: CPT | Performed by: EMERGENCY MEDICINE

## 2025-01-11 PROCEDURE — 85610 PROTHROMBIN TIME: CPT | Performed by: EMERGENCY MEDICINE

## 2025-01-11 PROCEDURE — 96372 THER/PROPH/DIAG INJ SC/IM: CPT

## 2025-01-11 PROCEDURE — 250N000013 HC RX MED GY IP 250 OP 250 PS 637

## 2025-01-11 PROCEDURE — 80143 DRUG ASSAY ACETAMINOPHEN: CPT

## 2025-01-11 PROCEDURE — 250N000011 HC RX IP 250 OP 636

## 2025-01-11 RX ORDER — BUTALBITAL, ACETAMINOPHEN AND CAFFEINE 50; 325; 40 MG/1; MG/1; MG/1
1 TABLET ORAL ONCE
Status: COMPLETED | OUTPATIENT
Start: 2025-01-11 | End: 2025-01-11

## 2025-01-11 RX ORDER — CLONIDINE HYDROCHLORIDE 0.1 MG/1
0.1 TABLET ORAL 3 TIMES DAILY PRN
Qty: 6 TABLET | Refills: 0 | Status: ON HOLD | OUTPATIENT
Start: 2025-01-11

## 2025-01-11 RX ORDER — DEXAMETHASONE SODIUM PHOSPHATE 10 MG/ML
10 INJECTION, SOLUTION INTRAMUSCULAR; INTRAVENOUS ONCE
Status: COMPLETED | OUTPATIENT
Start: 2025-01-11 | End: 2025-01-11

## 2025-01-11 RX ORDER — ONDANSETRON 4 MG/1
4 TABLET, ORALLY DISINTEGRATING ORAL EVERY 8 HOURS PRN
Qty: 10 TABLET | Refills: 0 | Status: SHIPPED | OUTPATIENT
Start: 2025-01-11 | End: 2025-01-14

## 2025-01-11 RX ADMIN — DEXAMETHASONE SODIUM PHOSPHATE 10 MG: 10 INJECTION INTRAMUSCULAR; INTRAVENOUS at 10:45

## 2025-01-11 RX ADMIN — BUTALBITAL, ACETAMINOPHEN AND CAFFEINE 1 TABLET: 325; 50; 40 TABLET ORAL at 10:44

## 2025-01-11 ASSESSMENT — ACTIVITIES OF DAILY LIVING (ADL)
ADLS_ACUITY_SCORE: 45

## 2025-01-11 ASSESSMENT — COLUMBIA-SUICIDE SEVERITY RATING SCALE - C-SSRS
6. HAVE YOU EVER DONE ANYTHING, STARTED TO DO ANYTHING, OR PREPARED TO DO ANYTHING TO END YOUR LIFE?: NO
2. HAVE YOU ACTUALLY HAD ANY THOUGHTS OF KILLING YOURSELF IN THE PAST MONTH?: NO
1. IN THE PAST MONTH, HAVE YOU WISHED YOU WERE DEAD OR WISHED YOU COULD GO TO SLEEP AND NOT WAKE UP?: NO

## 2025-01-11 NOTE — DISCHARGE INSTRUCTIONS
Rest, sleep in dark room, cool compresses, heating pads, you may use ibuprofen sparingly as needed for headache. Refrain from any acetaminophen (Tylenol) for the next few days. You may take Clonidine up to 3 times per day as needed - do NOT exceed this amount. You may take Zofran as needed for nausea - do NOT exceed 3 timer per day.     Ibuprofen/Naproxen Discharge Instructions:  You may take ibuprofen for pain control.  The maximum dose of (ibuprofen is 3200 mg ) in a 24-hour period.    Take this medication with food to prevent stomach irritation.  With long-term use this medication can irritate the stomach causing pain and lead to development of a stomach ulcer.  If you notice stomach pain or vomiting of coffee-ground colored vomit or blood, please be seen by a healthcare provider.  Attempt to use this medication for the shortest time possible.      Please follow-up with your primary care provider as scheduled on Monday for reevaluation and ongoing management - referral placed today.     Return to the emergency department for any new or worsening symptoms including recurrent palpitations, tremor, chest pain, shortness of breath, exertional symptoms, lightheadedness/fainting, fever/chills, or any other concerning symptoms.    Take Care!  - Maribell Price PA-C

## 2025-01-11 NOTE — ED TRIAGE NOTES
Pt ambulatory to triage requesting prescription for fiorcet as she is experiencing withdrawal symptoms.  Pt reports she takes prescribed fiorcet for chronic headaches, as much as 6 times daily. She was trying to wean off but she states she needs it. Last dose was yesterday morning. Pt reports nausea, anxiety.
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
## 2025-01-11 NOTE — ED PROVIDER NOTES
Emergency Department Midlevel Supervisory Note     I had a face to face encounter with this patient seen by the Advanced Practice Provider (DELTA). I personally made/approved the management plan and take responsibility for the patient management. I personally saw patient and performed a substantive portion of the visit including all aspects of the medical decision making.     ED Course:  11:10 AM Maribell Price PA-C staffed patient with me. I agree with their assessment and plan of management, and I will see the patient.  11:14 AM I met with the patient to introduce myself, gather additional history, perform my initial exam, and discuss the plan.  Patient without overt signs of withdrawal.  Did discuss the dangers of opiate withdrawal.  Added Tylenol mother's liver function test given Tylenol content.  Will contact [facility] for potential detox placement.  11:20 AM.  [facility] does not do barbiturate withdrawal.  Made recommendations for other centers.  Will await for medical clearance.  Tylenol liver liver function test being obtained  12:06 PM.  Liver function test normal.  INR normal.  CBC normal.  Tylenol level pending  2:40 PM.  No detox facilities available for treatment of potential barbiturate withdrawal.  Patient without overt signs of withdrawal presently.  Minimally tachycardic.  No tremors.  No hypertension.  Will plan on discharge with Zofran and clonidine for symptomatic relief.  Routine return precautions given  Brief HPI:     Jennifer A Schwab is a 44 year old female who presents for evaluation of withdrawal symptoms.     The patient reports headache, nausea, vomiting, and palpitations since last night. She recently ran out of [employer] for chronic headaches. Patient takes it up to 6 times daily. She had her last dose 1 day ago. Patient states she has not slept or eaten. She feels increased anxiousness. Patient tried taking Zofran with no relief.     I, Sheryl Olivarez, am  "serving as a scribe to document services personally performed by Hammad Loera MD, based on my observations and the provider's statements to me.   I, Hammad Loera MD attest that Sheryl Olivarez was acting in a scribe capacity, has observed my performance of the services and has documented them in accordance with my direction.    Brief Physical Exam: /86   Pulse 105   Temp 98.2  F (36.8  C)   Resp 20   Ht 1.575 m (5' 2\")   Wt 73 kg (161 lb)   SpO2 99%   BMI 29.45 kg/m    Constitutional:  Alert, in mild acute distress  EYES: Conjunctivae clear  HENT:  Atraumatic  Respiratory:  Respirations even, unlabored, in no acute respiratory distress  Cardiovascular:  Regular rate and rhythm, good peripheral perfusion  GI: Soft, non-distended, non-tender  Musculoskeletal:  Moves all 4 extremities equally, grossly symmetrical strength  Integument: Warm & dry. No appreciable rash, erythema.  Neurologic:  Alert & oriented, speech clear and fluent, no focal deficits noted.  Anxious and tearful       MDM:  Patient is a 44-year-old female arrives with reports of headache.  Typically takes butalbital routinely for headaches.  States has been taking it normally but headaches persisting.  Then did confide in PA that she has been overly using her butalbital.  By calculations patient has been taking upwards of 750 mg of butalbital daily along with excessive Tylenol.  Plan will be for baseline blood work to assess for Tylenol toxicity/liver function abnormalities.  Patient will likely require hospitalization for withdrawal from the barbiturate.       1. Headache    2. Encounter for medication refill        Consults:  Poison control, [facility] detox  Labs and Imaging:     Results for orders placed or performed during the hospital encounter of 01/11/25   Acetaminophen level     Status: Abnormal   Result Value Ref Range    Acetaminophen 7.3 (L) 10.0 - 30.0 ug/mL   INR     Status: Normal   Result Value Ref Range "    INR 0.90 0.85 - 1.15   Comprehensive metabolic panel     Status: Abnormal   Result Value Ref Range    Sodium 138 135 - 145 mmol/L    Potassium 3.9 3.4 - 5.3 mmol/L    Carbon Dioxide (CO2) 23 22 - 29 mmol/L    Anion Gap 10 7 - 15 mmol/L    Urea Nitrogen 5.5 (L) 6.0 - 20.0 mg/dL    Creatinine 0.79 0.51 - 0.95 mg/dL    GFR Estimate >90 >60 mL/min/1.73m2    Calcium 9.7 8.8 - 10.4 mg/dL    Chloride 105 98 - 107 mmol/L    Glucose 100 (H) 70 - 99 mg/dL    Alkaline Phosphatase 112 40 - 150 U/L    AST 17 0 - 45 U/L    ALT 21 0 - 50 U/L    Protein Total 7.6 6.4 - 8.3 g/dL    Albumin 4.7 3.5 - 5.2 g/dL    Bilirubin Total 0.2 <=1.2 mg/dL   CBC (+ platelets, no diff)     Status: Normal   Result Value Ref Range    WBC Count 8.0 4.0 - 11.0 10e3/uL    RBC Count 4.31 3.80 - 5.20 10e6/uL    Hemoglobin 13.4 11.7 - 15.7 g/dL    Hematocrit 40.3 35.0 - 47.0 %    MCV 94 78 - 100 fL    MCH 31.1 26.5 - 33.0 pg    MCHC 33.3 31.5 - 36.5 g/dL    RDW 12.4 10.0 - 15.0 %    Platelet Count 372 150 - 450 10e3/uL      I have reviewed the relevant laboratory studies above.      Hammad Loera MD  [facility] EMERGENCY DEPARTMENT  [address] [place] [phone]     Hammad Loera MD  01/11/25 8688

## 2025-01-12 ENCOUNTER — HEALTH MAINTENANCE LETTER (OUTPATIENT)
Age: 45
End: 2025-01-12

## 2025-01-15 ENCOUNTER — TELEPHONE (OUTPATIENT)
Dept: BEHAVIORAL HEALTH | Facility: CLINIC | Age: 45
End: 2025-01-15
Payer: COMMERCIAL

## 2025-01-15 NOTE — TELEPHONE ENCOUNTER
"S:  Per , we can detox from Fioricet.     Pt will need to go through  the regular admission process.  There is not a bed \"held\" for her and pt will need to be presented to a provider after her evaluation in the ED.    I have explained this to her, and she expressed understanding of the process.  "

## 2025-01-15 NOTE — TELEPHONE ENCOUNTER
1:56 PM: Dr. Villalobos and Dr. Chatterjee ([facility] Addiction Provider) called inquiring about a Pt that is needing detox for barbiturates. Pt has been declined for 3A and wanting to know if Pt can be assessed upon arrival.    2:15 PM: Dr. Ho notified Intake that Pt is coming to the [facility] for clearance to be admitted to  for detoxification from Fioricet.

## 2025-01-16 ENCOUNTER — TRANSFERRED RECORDS (OUTPATIENT)
Dept: HEALTH INFORMATION MANAGEMENT | Facility: CLINIC | Age: 45
End: 2025-01-16

## 2025-01-16 ENCOUNTER — TELEPHONE (OUTPATIENT)
Dept: BEHAVIORAL HEALTH | Facility: CLINIC | Age: 45
End: 2025-01-16

## 2025-01-16 ENCOUNTER — HOSPITAL ENCOUNTER (INPATIENT)
Facility: CLINIC | Age: 45
End: 2025-01-16
Attending: FAMILY MEDICINE | Admitting: PSYCHIATRY & NEUROLOGY
Payer: COMMERCIAL

## 2025-01-16 VITALS
HEART RATE: 90 BPM | SYSTOLIC BLOOD PRESSURE: 147 MMHG | TEMPERATURE: 97.9 F | DIASTOLIC BLOOD PRESSURE: 94 MMHG | OXYGEN SATURATION: 100 % | RESPIRATION RATE: 16 BRPM

## 2025-01-16 DIAGNOSIS — F41.1 GENERALIZED ANXIETY DISORDER: Primary | ICD-10-CM

## 2025-01-16 DIAGNOSIS — F13.20 BARBITURATE DEPENDENCE (H): ICD-10-CM

## 2025-01-16 LAB
ALBUMIN SERPL BCG-MCNC: 4.6 G/DL (ref 3.5–5.2)
ALP SERPL-CCNC: 103 U/L (ref 40–150)
ALT SERPL W P-5'-P-CCNC: 33 U/L (ref 0–50)
AMPHETAMINES UR QL SCN: ABNORMAL
ANION GAP SERPL CALCULATED.3IONS-SCNC: 14 MMOL/L (ref 7–15)
AST SERPL W P-5'-P-CCNC: 22 U/L (ref 0–45)
ATRIAL RATE - MUSE: 92 BPM
BARBITURATES UR QL SCN: ABNORMAL
BASOPHILS # BLD AUTO: 0 10E3/UL (ref 0–0.2)
BASOPHILS NFR BLD AUTO: 0 %
BENZODIAZ UR QL SCN: ABNORMAL
BILIRUB SERPL-MCNC: 0.2 MG/DL
BUN SERPL-MCNC: 6.5 MG/DL (ref 6–20)
BZE UR QL SCN: ABNORMAL
CALCIUM SERPL-MCNC: 9.5 MG/DL (ref 8.8–10.4)
CANNABINOIDS UR QL SCN: ABNORMAL
CHLORIDE SERPL-SCNC: 103 MMOL/L (ref 98–107)
CREAT SERPL-MCNC: 0.85 MG/DL (ref 0.51–0.95)
DIASTOLIC BLOOD PRESSURE - MUSE: NORMAL MMHG
EGFRCR SERPLBLD CKD-EPI 2021: 87 ML/MIN/1.73M2
EOSINOPHIL # BLD AUTO: 0 10E3/UL (ref 0–0.7)
EOSINOPHIL NFR BLD AUTO: 0 %
ERYTHROCYTE [DISTWIDTH] IN BLOOD BY AUTOMATED COUNT: 12.3 % (ref 10–15)
ETHANOL SERPL-MCNC: <0.01 G/DL
FENTANYL UR QL: ABNORMAL
GLUCOSE SERPL-MCNC: 97 MG/DL (ref 70–99)
HCG UR QL: NEGATIVE
HCO3 SERPL-SCNC: 21 MMOL/L (ref 22–29)
HCT VFR BLD AUTO: 38.9 % (ref 35–47)
HGB BLD-MCNC: 13.5 G/DL (ref 11.7–15.7)
IMM GRANULOCYTES # BLD: 0 10E3/UL
IMM GRANULOCYTES NFR BLD: 0 %
INTERPRETATION ECG - MUSE: NORMAL
LYMPHOCYTES # BLD AUTO: 2.2 10E3/UL (ref 0.8–5.3)
LYMPHOCYTES NFR BLD AUTO: 28 %
MAGNESIUM SERPL-MCNC: 2.3 MG/DL (ref 1.7–2.3)
MCH RBC QN AUTO: 31.3 PG (ref 26.5–33)
MCHC RBC AUTO-ENTMCNC: 34.7 G/DL (ref 31.5–36.5)
MCV RBC AUTO: 90 FL (ref 78–100)
MONOCYTES # BLD AUTO: 0.7 10E3/UL (ref 0–1.3)
MONOCYTES NFR BLD AUTO: 8 %
NEUTROPHILS # BLD AUTO: 4.9 10E3/UL (ref 1.6–8.3)
NEUTROPHILS NFR BLD AUTO: 63 %
NRBC # BLD AUTO: 0 10E3/UL
NRBC BLD AUTO-RTO: 0 /100
OPIATES UR QL SCN: ABNORMAL
P AXIS - MUSE: 60 DEGREES
PCP QUAL URINE (ROCHE): ABNORMAL
PLATELET # BLD AUTO: 363 10E3/UL (ref 150–450)
POTASSIUM SERPL-SCNC: 3.6 MMOL/L (ref 3.4–5.3)
PR INTERVAL - MUSE: 118 MS
PROT SERPL-MCNC: 7.6 G/DL (ref 6.4–8.3)
QRS DURATION - MUSE: 86 MS
QT - MUSE: 366 MS
QTC - MUSE: 452 MS
R AXIS - MUSE: 20 DEGREES
RBC # BLD AUTO: 4.32 10E6/UL (ref 3.8–5.2)
SODIUM SERPL-SCNC: 138 MMOL/L (ref 135–145)
SYSTOLIC BLOOD PRESSURE - MUSE: NORMAL MMHG
T AXIS - MUSE: 29 DEGREES
TSH SERPL DL<=0.005 MIU/L-ACNC: 0.82 UIU/ML (ref 0.3–4.2)
VENTRICULAR RATE- MUSE: 92 BPM
WBC # BLD AUTO: 7.8 10E3/UL (ref 4–11)

## 2025-01-16 PROCEDURE — 93010 ELECTROCARDIOGRAM REPORT: CPT | Performed by: FAMILY MEDICINE

## 2025-01-16 PROCEDURE — 99285 EMERGENCY DEPT VISIT HI MDM: CPT | Performed by: FAMILY MEDICINE

## 2025-01-16 PROCEDURE — 82247 BILIRUBIN TOTAL: CPT | Performed by: FAMILY MEDICINE

## 2025-01-16 PROCEDURE — 99223 1ST HOSP IP/OBS HIGH 75: CPT | Performed by: PSYCHIATRY & NEUROLOGY

## 2025-01-16 PROCEDURE — 84155 ASSAY OF PROTEIN SERUM: CPT | Performed by: FAMILY MEDICINE

## 2025-01-16 PROCEDURE — 81025 URINE PREGNANCY TEST: CPT | Performed by: FAMILY MEDICINE

## 2025-01-16 PROCEDURE — 85004 AUTOMATED DIFF WBC COUNT: CPT | Performed by: FAMILY MEDICINE

## 2025-01-16 PROCEDURE — 250N000013 HC RX MED GY IP 250 OP 250 PS 637: Performed by: STUDENT IN AN ORGANIZED HEALTH CARE EDUCATION/TRAINING PROGRAM

## 2025-01-16 PROCEDURE — 250N000013 HC RX MED GY IP 250 OP 250 PS 637: Performed by: PSYCHIATRY & NEUROLOGY

## 2025-01-16 PROCEDURE — 82077 ASSAY SPEC XCP UR&BREATH IA: CPT | Performed by: FAMILY MEDICINE

## 2025-01-16 PROCEDURE — 80307 DRUG TEST PRSMV CHEM ANLYZR: CPT | Performed by: FAMILY MEDICINE

## 2025-01-16 PROCEDURE — 128N000004 HC R&B CD ADULT

## 2025-01-16 PROCEDURE — 99254 IP/OBS CNSLTJ NEW/EST MOD 60: CPT | Performed by: STUDENT IN AN ORGANIZED HEALTH CARE EDUCATION/TRAINING PROGRAM

## 2025-01-16 PROCEDURE — 85041 AUTOMATED RBC COUNT: CPT | Performed by: FAMILY MEDICINE

## 2025-01-16 PROCEDURE — 250N000013 HC RX MED GY IP 250 OP 250 PS 637: Performed by: FAMILY MEDICINE

## 2025-01-16 PROCEDURE — 83735 ASSAY OF MAGNESIUM: CPT | Performed by: FAMILY MEDICINE

## 2025-01-16 PROCEDURE — 250N000011 HC RX IP 250 OP 636: Performed by: PSYCHIATRY & NEUROLOGY

## 2025-01-16 PROCEDURE — 84443 ASSAY THYROID STIM HORMONE: CPT | Performed by: PSYCHIATRY & NEUROLOGY

## 2025-01-16 PROCEDURE — 93005 ELECTROCARDIOGRAM TRACING: CPT | Performed by: FAMILY MEDICINE

## 2025-01-16 PROCEDURE — 36415 COLL VENOUS BLD VENIPUNCTURE: CPT | Performed by: FAMILY MEDICINE

## 2025-01-16 PROCEDURE — HZ2ZZZZ DETOXIFICATION SERVICES FOR SUBSTANCE ABUSE TREATMENT: ICD-10-PCS | Performed by: FAMILY MEDICINE

## 2025-01-16 PROCEDURE — 85014 HEMATOCRIT: CPT | Performed by: FAMILY MEDICINE

## 2025-01-16 RX ORDER — SUMATRIPTAN 50 MG/1
100 TABLET, FILM COATED ORAL 2 TIMES DAILY PRN
Status: DISCONTINUED | OUTPATIENT
Start: 2025-01-16 | End: 2025-01-19 | Stop reason: HOSPADM

## 2025-01-16 RX ORDER — AMITRIPTYLINE HYDROCHLORIDE 10 MG/1
10 TABLET ORAL AT BEDTIME
COMMUNITY

## 2025-01-16 RX ORDER — HYDRALAZINE HYDROCHLORIDE 10 MG/1
10 TABLET, FILM COATED ORAL 4 TIMES DAILY PRN
Status: DISCONTINUED | OUTPATIENT
Start: 2025-01-16 | End: 2025-01-19 | Stop reason: HOSPADM

## 2025-01-16 RX ORDER — NICOTINE 21 MG/24HR
1 PATCH, TRANSDERMAL 24 HOURS TRANSDERMAL DAILY
Status: DISCONTINUED | OUTPATIENT
Start: 2025-01-16 | End: 2025-01-19 | Stop reason: HOSPADM

## 2025-01-16 RX ORDER — PHENOBARBITAL 64.8 MG/1
64.8 TABLET ORAL 4 TIMES DAILY
Status: COMPLETED | OUTPATIENT
Start: 2025-01-16 | End: 2025-01-18

## 2025-01-16 RX ORDER — TIZANIDINE 2 MG/1
2 TABLET ORAL AT BEDTIME
Status: DISCONTINUED | OUTPATIENT
Start: 2025-01-16 | End: 2025-01-19 | Stop reason: HOSPADM

## 2025-01-16 RX ORDER — TIZANIDINE 2 MG/1
2 TABLET ORAL DAILY PRN
Status: DISCONTINUED | OUTPATIENT
Start: 2025-01-16 | End: 2025-01-19 | Stop reason: HOSPADM

## 2025-01-16 RX ORDER — TIZANIDINE HYDROCHLORIDE 2 MG/1
2 CAPSULE, GELATIN COATED ORAL AT BEDTIME
COMMUNITY

## 2025-01-16 RX ORDER — BUTALBITAL, ACETAMINOPHEN AND CAFFEINE 50; 325; 40 MG/1; MG/1; MG/1
2 TABLET ORAL EVERY 4 HOURS PRN
Status: ON HOLD | COMMUNITY
End: 2025-01-17

## 2025-01-16 RX ORDER — TIZANIDINE 2 MG/1
2 TABLET ORAL EVERY 6 HOURS PRN
Status: DISCONTINUED | OUTPATIENT
Start: 2025-01-16 | End: 2025-01-16

## 2025-01-16 RX ORDER — ONDANSETRON 4 MG/1
4 TABLET, ORALLY DISINTEGRATING ORAL EVERY 6 HOURS PRN
Status: DISCONTINUED | OUTPATIENT
Start: 2025-01-16 | End: 2025-01-19 | Stop reason: HOSPADM

## 2025-01-16 RX ORDER — IBUPROFEN 400 MG/1
800 TABLET, FILM COATED ORAL EVERY 8 HOURS PRN
Status: DISCONTINUED | OUTPATIENT
Start: 2025-01-16 | End: 2025-01-19 | Stop reason: HOSPADM

## 2025-01-16 RX ORDER — RABEPRAZOLE SODIUM 20 MG/1
20 TABLET, DELAYED RELEASE ORAL 2 TIMES DAILY
Status: DISCONTINUED | OUTPATIENT
Start: 2025-01-16 | End: 2025-01-19 | Stop reason: HOSPADM

## 2025-01-16 RX ORDER — ACETAMINOPHEN 325 MG/1
650 TABLET ORAL EVERY 4 HOURS PRN
Status: DISCONTINUED | OUTPATIENT
Start: 2025-01-16 | End: 2025-01-16

## 2025-01-16 RX ORDER — HYDROXYZINE HYDROCHLORIDE 25 MG/1
25 TABLET, FILM COATED ORAL EVERY 4 HOURS PRN
Status: DISCONTINUED | OUTPATIENT
Start: 2025-01-16 | End: 2025-01-19 | Stop reason: HOSPADM

## 2025-01-16 RX ORDER — AMITRIPTYLINE HYDROCHLORIDE 10 MG/1
10 TABLET ORAL AT BEDTIME
Status: DISCONTINUED | OUTPATIENT
Start: 2025-01-16 | End: 2025-01-19 | Stop reason: HOSPADM

## 2025-01-16 RX ORDER — PHENOBARBITAL 64.8 MG/1
64.8 TABLET ORAL 3 TIMES DAILY
Status: DISCONTINUED | OUTPATIENT
Start: 2025-01-16 | End: 2025-01-16

## 2025-01-16 RX ORDER — RIBOFLAVIN (VITAMIN B2) 100 MG
400 TABLET ORAL DAILY
Status: DISCONTINUED | OUTPATIENT
Start: 2025-01-16 | End: 2025-01-19 | Stop reason: HOSPADM

## 2025-01-16 RX ORDER — OLANZAPINE 10 MG/1
10 TABLET ORAL 3 TIMES DAILY PRN
Status: DISCONTINUED | OUTPATIENT
Start: 2025-01-16 | End: 2025-01-19 | Stop reason: HOSPADM

## 2025-01-16 RX ORDER — AMOXICILLIN 250 MG
1 CAPSULE ORAL 2 TIMES DAILY PRN
Status: DISCONTINUED | OUTPATIENT
Start: 2025-01-16 | End: 2025-01-19 | Stop reason: HOSPADM

## 2025-01-16 RX ORDER — ONDANSETRON 4 MG/1
4 TABLET, ORALLY DISINTEGRATING ORAL DAILY
Status: ON HOLD | COMMUNITY
End: 2025-01-17

## 2025-01-16 RX ORDER — TRAZODONE HYDROCHLORIDE 50 MG/1
50 TABLET, FILM COATED ORAL
Status: DISCONTINUED | OUTPATIENT
Start: 2025-01-16 | End: 2025-01-19 | Stop reason: HOSPADM

## 2025-01-16 RX ORDER — HYDROXYZINE HYDROCHLORIDE 25 MG/1
25 TABLET, FILM COATED ORAL 3 TIMES DAILY PRN
Status: ON HOLD | COMMUNITY
End: 2025-01-17

## 2025-01-16 RX ORDER — ERGOCALCIFEROL 1.25 MG/1
50000 CAPSULE, LIQUID FILLED ORAL WEEKLY
Status: DISCONTINUED | OUTPATIENT
Start: 2025-01-19 | End: 2025-01-19 | Stop reason: HOSPADM

## 2025-01-16 RX ORDER — CETIRIZINE HYDROCHLORIDE 5 MG/1
10 TABLET ORAL 2 TIMES DAILY
Status: DISCONTINUED | OUTPATIENT
Start: 2025-01-16 | End: 2025-01-19 | Stop reason: HOSPADM

## 2025-01-16 RX ORDER — LORAZEPAM 1 MG/1
1 TABLET ORAL ONCE
Status: COMPLETED | OUTPATIENT
Start: 2025-01-16 | End: 2025-01-16

## 2025-01-16 RX ORDER — MAGNESIUM HYDROXIDE/ALUMINUM HYDROXICE/SIMETHICONE 120; 1200; 1200 MG/30ML; MG/30ML; MG/30ML
30 SUSPENSION ORAL EVERY 4 HOURS PRN
Status: DISCONTINUED | OUTPATIENT
Start: 2025-01-16 | End: 2025-01-19 | Stop reason: HOSPADM

## 2025-01-16 RX ORDER — ERGOCALCIFEROL 1.25 MG/1
50000 CAPSULE, LIQUID FILLED ORAL WEEKLY
COMMUNITY

## 2025-01-16 RX ORDER — OLANZAPINE 10 MG/2ML
10 INJECTION, POWDER, FOR SOLUTION INTRAMUSCULAR 3 TIMES DAILY PRN
Status: DISCONTINUED | OUTPATIENT
Start: 2025-01-16 | End: 2025-01-19 | Stop reason: HOSPADM

## 2025-01-16 RX ORDER — RABEPRAZOLE SODIUM 20 MG/1
20 TABLET, DELAYED RELEASE ORAL 2 TIMES DAILY
COMMUNITY

## 2025-01-16 RX ORDER — CLONIDINE HYDROCHLORIDE 0.1 MG/1
0.1 TABLET ORAL 3 TIMES DAILY PRN
Status: DISCONTINUED | OUTPATIENT
Start: 2025-01-16 | End: 2025-01-19 | Stop reason: HOSPADM

## 2025-01-16 RX ORDER — LOPERAMIDE HYDROCHLORIDE 2 MG/1
2 CAPSULE ORAL 4 TIMES DAILY PRN
Status: DISCONTINUED | OUTPATIENT
Start: 2025-01-16 | End: 2025-01-19 | Stop reason: HOSPADM

## 2025-01-16 RX ADMIN — NICOTINE POLACRILEX 2 MG: 2 GUM, CHEWING BUCCAL at 14:59

## 2025-01-16 RX ADMIN — RABEPRAZOLE SODIUM 20 MG: 20 TABLET, DELAYED RELEASE ORAL at 18:40

## 2025-01-16 RX ADMIN — TIZANIDINE 2 MG: 2 TABLET ORAL at 21:29

## 2025-01-16 RX ADMIN — NICOTINE 1 PATCH: 21 PATCH, EXTENDED RELEASE TRANSDERMAL at 13:18

## 2025-01-16 RX ADMIN — NICOTINE POLACRILEX 2 MG: 2 GUM, CHEWING BUCCAL at 17:34

## 2025-01-16 RX ADMIN — AMITRIPTYLINE HYDROCHLORIDE 10 MG: 10 TABLET, FILM COATED ORAL at 21:29

## 2025-01-16 RX ADMIN — LORAZEPAM 1 MG: 1 TABLET ORAL at 10:57

## 2025-01-16 RX ADMIN — PHENOBARBITAL 64.8 MG: 64.8 TABLET ORAL at 16:17

## 2025-01-16 RX ADMIN — IBUPROFEN 800 MG: 400 TABLET, FILM COATED ORAL at 17:05

## 2025-01-16 RX ADMIN — SUMATRIPTAN SUCCINATE 100 MG: 50 TABLET ORAL at 16:17

## 2025-01-16 RX ADMIN — Medication 200 MG: at 13:40

## 2025-01-16 RX ADMIN — PHENOBARBITAL 64.8 MG: 64.8 TABLET ORAL at 13:18

## 2025-01-16 RX ADMIN — ONDANSETRON 4 MG: 4 TABLET, ORALLY DISINTEGRATING ORAL at 20:41

## 2025-01-16 RX ADMIN — ONDANSETRON 4 MG: 4 TABLET, ORALLY DISINTEGRATING ORAL at 13:40

## 2025-01-16 RX ADMIN — PHENOBARBITAL 64.8 MG: 64.8 TABLET ORAL at 20:17

## 2025-01-16 RX ADMIN — TRAZODONE HYDROCHLORIDE 50 MG: 50 TABLET ORAL at 21:30

## 2025-01-16 RX ADMIN — CETIRIZINE HYDROCHLORIDE 10 MG: 5 TABLET ORAL at 20:17

## 2025-01-16 RX ADMIN — NICOTINE POLACRILEX 2 MG: 2 GUM, CHEWING BUCCAL at 20:17

## 2025-01-16 RX ADMIN — Medication 400 MG: at 16:19

## 2025-01-16 ASSESSMENT — ACTIVITIES OF DAILY LIVING (ADL)
ADLS_ACUITY_SCORE: 45
ADLS_ACUITY_SCORE: 22
ADLS_ACUITY_SCORE: 45
ADLS_ACUITY_SCORE: 22
ADLS_ACUITY_SCORE: 45
ADLS_ACUITY_SCORE: 22
ADLS_ACUITY_SCORE: 45
ADLS_ACUITY_SCORE: 45

## 2025-01-16 ASSESSMENT — COLUMBIA-SUICIDE SEVERITY RATING SCALE - C-SSRS
2. HAVE YOU ACTUALLY HAD ANY THOUGHTS OF KILLING YOURSELF IN THE PAST MONTH?: NO
6. HAVE YOU EVER DONE ANYTHING, STARTED TO DO ANYTHING, OR PREPARED TO DO ANYTHING TO END YOUR LIFE?: NO
1. IN THE PAST MONTH, HAVE YOU WISHED YOU WERE DEAD OR WISHED YOU COULD GO TO SLEEP AND NOT WAKE UP?: NO

## 2025-01-16 NOTE — ED TRIAGE NOTES
Pt seeking detox from prescription medication. Fioricet. Pt states she has been taking more of this medication in the last six months.      Triage Assessment (Adult)       Row Name 01/16/25 0857          Triage Assessment    Airway WDL WDL        Respiratory WDL    Respiratory WDL WDL        Skin Circulation/Temperature WDL    Skin Circulation/Temperature WDL WDL        Cardiac WDL    Cardiac WDL WDL        Peripheral/Neurovascular WDL    Peripheral Neurovascular WDL WDL        Cognitive/Neuro/Behavioral WDL    Cognitive/Neuro/Behavioral WDL WDL

## 2025-01-16 NOTE — CONSULTS
[facility]  Internal Medicine Consult     Jennifer A Schwab MRN# [id number]   Age: 44 year old YOB: 1980     Date of Admission: 1/16/2025  Date of Consult: 1/16/2025    Primary Care Provider: Jairo Colindres    Requesting Service: Behavioral Health - Leola Shane MD  Reason for Consult: General Medical Evaluation      SUBJECTIVE   CC:   Barbiturate Withdrawal and Detox    Assessment and Plan/Recommendations:     Jennifer A Schwab is a 44 year old female with PMHx significant for migraines, abnormal uterine bleeding.  Patient was admitted to station 3A for detox. Internal medicine consulted for medical co-management.    # Barbiturate Withdrawal   # Migraines   Reviewed PCP note from yesterday - PCP advised pt come to detox due to taking up to 17-18 Fioricet (butalbital 50 mg, acetaminophen 325 mg, and caffeine 40 mg) tablets daily. Per information provided to the ED provider, patient had a ED visit on 12/17/2024 and she was prescribed 84 tablets of Fioricet to be refilled on 1/6/2025. Upon reevaluation, patient did admit to filling this prescription and having taken all 84 tablets within the last 5 days prior to admission. LFTs checked due to pt taking excessive amounts of acetaminophen and were normal.  On discussion with patient, she reports being started on topiramate 2 years ago which did not work for her migraines and then she was transitioned to Fioricet as a second line by her PCP.  Fioricet never worked great for her but she reports she was told to continue just taking a higher dose by her PCP, which is ultimately led to her current situation.  She has never tried a triptan or CRGP inhibitor. She was recently started on Amitriptyline 10 mg as a preventative medications  - Withdrawal management per Psychiatry    - Starting Phenobarbital 64.8 mg QID   - Continue with Amitriptyline 10 mg daily   - Will start Sumatriptan 100 mg PO PRN for onset of migraine. May repeat  dose in 2 hours if no relief.  Do not exceed 2 doses in 24 hours.  - Pt may be ultimately benefit from neurology consult or referral to headache specialist as outpatient     # Hypertension in setting of withdrawal   - treat underlying withdrawal   - Hydralazine 10 mg PO q 4 hours PRN for SBP > 180 or DBP >120    # Insomnia   Pt reports difficulty sleeping for which she takes tizanidine for. I suspect part of her trouble sleeping is the Caffeine in Fiorcet.   - Recommend against tizanidine for insomnia is a muscle relaxant   - Defer management to psychiatry     # GERD  - PTA Rabeprazole 20 mg BID - pt's  to bring in pt's home suppley    # tobacco use disorder   - Nicotine patch     # Abnormal uterine bleeding   Ongoing issue for which she has been worked up as an outpatient.  Patient reports she has had on and off abdominal pain, nausea, vomiting for several months but has been unclear if that was due to her uterine bleeding versus medication overuse.  Patient had an ultrasound yesterday that showed a lobular appearing uterus with cavitary lesion and a small amount of fluid noted around uterine cavity.  Patient's outpatient team has recommended hysterectomy.  - Continue following with outpatient providers      Medicine will continue to follow       Juan Davis PA-C  Internal Medicine DELTA Hospitalist  Page job code [phone] (3B), [phone] (3A), or [phone] ([facility] and 4A)  Text paging via Tutor Assignment is appreciated  January 16, 2025         HPI:     Patient resting comfortably on my exam.  Patient reports she has dealt with migraines for several years.  She was initially started on Topamax but that did not help her so her PCP ultimately put her on Fioricet a couple of years ago.  Initially started she was just taking a few tablets here and there but over time escalated to daily use as it was not helping.  Patient reports that despite not helping her PCP and just told her to take more instead of trying a new medication.   She has not tried sumatriptan or CRG P inhibitors.  Patient ultimately started taking more and more of the Fioricet and is now noticed she has been feeling unwell for several months.  Symptoms are clouded by her also having abnormal uterine bleeding and undergoing workup for hysterectomy.  Patient is the  of her company and says when she would go on work trips and not have enough Fioricet she would become significantly unwell and thus started feeling prescriptions ahead of time to take on frequent work trips.  Her overuse escalated substantially since November and she now reports taking as high as 17 to 18 tablets daily.  She has started to wean herself down and reports taking her from 5 to 13 tablets most days recently.    Denies chest pain, shortness of breath.  Does report feeling anxious and having a racing heartbeat.  Denies fevers, chills, night sweats.       Past Medical History:     Past Medical History:   Diagnosis Date    Cervical high risk HPV (human papillomavirus) test positive 11/17/2016    GERD (gastroesophageal reflux disease)     History of anesthesia complications     Intussusception (H)     Intussusception intestine (H) 2013    PONV (postoperative nausea and vomiting)     Preeclampsia in postpartum period 2010    1 week after 2nd delivery        Reviewed and updated in [facility].     Past Surgical History:      Past Surgical History:   Procedure Laterality Date    ABDOMEN SURGERY      INTUSSUSCEPTION REPAIR      [place] HYSTEROSCOPY,W/ENDO BX N/A 10/5/2018    Procedure: HYSTEROSCOPY, WITH DILATION AND CURETTAGE;  Surgeon: Dallas Elizabeth MD;  Location: [facility];  Service: Gynecology    [place] HYSTEROSCOPY,W/ENDO BX N/A 12/9/2020    Procedure: HYSTEROSCOPY, WITH DILATION AND CURETTAGE, NOVASURE ABLATION;  Surgeon: Ortega Jackson MD;  Location: [facility];  Service: Gynecology    W[place] LAP,TUBAL CAUTERY N/A 10/5/2018    Procedure: LAPAROSCOPIC TUBAL LIGATION, HYSTEROSCOPY, WITH  DILATION AND CURETTAGE;  Surgeon: Dallas Elizabeth MD;  Location: [facility];  Service: Gynecology    SURGICAL HISTORY OF -   2013    intussusception    WISDOM TOOTH EXTRACTION           Social History:     Social History     Tobacco Use    Smoking status: Smoker, Current Status Unknown     Current packs/day: 0.25     Types: Cigarettes    Smokeless tobacco: Never    Tobacco comments:     smoking 20cig/day- down to 5 cig/day with pregnancy   Substance Use Topics    Alcohol use: No     Alcohol/week: 0.0 standard drinks of alcohol     Comment: rare- quit with pregnancy    Drug use: No        Family History:     Family History   Problem Relation Age of Onset    Down Syndrome Other         husbands 1/2 brother    Gastrointestinal Disease Daughter         severe constipation    Unknown/Adopted Father         unknown family history    Breast Cancer Maternal Grandmother     Diabetes Mother         type II before gastric bypass    Other Cancer Mother          Allergies:     Allergies   Allergen Reactions    Paper Tape [Adhesive Tape] Hives         Medications:   Reviewed. Please see MAR     Review of Systems:   10 point ROS of systems including Constitutional, Eyes, Respiratory, Cardiovascular, Gastroenterology, Genitourinary, Integumentary, Muscularskeletal, Psychiatric were all negative except for pertinent positives noted in my HPI.    OBJECTIVE   Physical Exam:   Vitals were reviewed  Blood pressure (!) 178/92, pulse 112, temperature 98.6  F (37  C), resp. rate 18, SpO2 99%, unknown if currently breastfeeding.  General: Alert and oriented x3. Somewhat tremulous   EYES: PERRLA, EOM. Anicteric   HENT: Atraumatic.   Lungs: No increased work of breathing. CTAB  Cards: RRR, no m/r/g    GI: Abdomen soft, non-tender without rebound or guarding. + Bowel sounds.  Neurologic: No focal deficits, CN II-XII grossly intact  Skin: No jaundice, rashes, or lesions        Data:        Lab Results   Component Value Date      01/16/2025     06/23/2017    Lab Results   Component Value Date    CHLORIDE 103 01/16/2025    CHLORIDE 105 10/08/2018    CHLORIDE 109 06/23/2017    Lab Results   Component Value Date    BUN 6.5 01/16/2025    BUN 9 10/08/2018    BUN 7 06/23/2017      Lab Results   Component Value Date    POTASSIUM 3.6 01/16/2025    POTASSIUM 4.2 10/08/2018    POTASSIUM 3.8 06/23/2017    Lab Results   Component Value Date    CO2 21 01/16/2025    CO2 27 10/08/2018    CO2 24 06/23/2017    Lab Results   Component Value Date    CR 0.85 01/16/2025    CR 0.62 06/23/2017        Lab Results   Component Value Date    WBC 7.8 01/16/2025    HGB 13.5 01/16/2025    HCT 38.9 01/16/2025    MCV 90 01/16/2025     01/16/2025     Lab Results   Component Value Date    WBC 7.8 01/16/2025

## 2025-01-16 NOTE — PROGRESS NOTES
01/16/25 1434   Patient Belongings   Did you bring any home meds/supplements to the hospital?  No   Patient Belongings other (see comments);remains with patient   Patient Belongings Remaining with Patient clothing   Patient Belongings Put in Hospital Secure Location (Security or Locker, etc.) clothing;tote;other (see comments)   Belongings Search Yes   Clothing Search Yes   Second Staff Kary Fisher RN     Bin: Tote bag,     Remains with pt: 2x sweat shirt no strings, 3 shirts, 4x underwear, 2x pants no strings, 3x pairs of socks, 2x bras    Med Room: NO CELL PHONE, NO WALLET    Security: None    Meds: None    A             ADMISSION:    I am responsible for any personal items that are not sent to the safe or pharmacy. [facility] is not responsible for loss, theft or damage of any property in my possession.    Patient Signature _________________________________________ Date/Time _____________________    Staff Signature ___________________________________________ Date/Time _____________________    2nd Staff person, if patient is unable/unwilling to sign    ________________________________________________________ Date/Time _____________________    DISCHARGE:    All personal items have been returned to me.    Patient Signature _________________________________________ Date/Time _____________________    Staff Signature ___________________________________________ Date/Time _____________________

## 2025-01-16 NOTE — PROGRESS NOTES
Pt is a 44 year old female, admitted at 1225 from the ED.   Voluntary legal status. Seeking admission for detox from prescription Fioricet (barbiturate) .     Pt is tearful on admission. Expresses feeling scared and uncomfortable as this is her first experience in detox.   She was cooperative with admission process. She would like to discharge home. She denies need for treatment or therapy.   __________________________________    Per chart review (Provider note)- This patient is a 44 year old female with history of migraines and Fiorcet abuse/dependence who presented to ED seeking detox from Fioricet (barbiturates). Medically cleared in ED, admitted to  as voluntary patient. Taking as much as 20 tablets of Fiorcet daily, recently around 13 tabs/day; EtOH <0.01, UDS positive for barbiturates. MSSA with phenobarbital started for barbiturate withdrawal.   __________________________________    Pt received first dose of Phenobarbital.   She was also given prn Zofran for nausea.   She reports poor appetite and poor intake since Saturday 1/11/25.. She ate about half of her lunch tray today.   MSSA-12.    Hypertensive and tachycardic on admission.   BP (!) 178/92   Pulse 112   Temp 98.6  F (37  C)   Resp 18   SpO2 99%     Pt oriented to the unit, staff and routines.     Pt informed of new orders including prn agents for headache and withdrawal discomfort.

## 2025-01-16 NOTE — TELEPHONE ENCOUNTER
S: [facility] , Provider Dr. Christanson calling at 10:24 AM with clinical on a 44 year old/Female presenting for Barbiturate detox.     B: Pt presents for ETOH detox.   Currently reports using Barbituates (Fioricet)   Patient reports last use was 1/15  Pt LAKE: 0  Pt  denies hx of DT  Pt  denies hx of seizures. Last seizure: N/A  Pt endorsing the following symptoms of withdrawal:  N/A  MSSA Score: N/A    Pt denies acute mental health or medical concerns.   Pt denies other drug use: (!) OTHER- Fioricet  Amount/frequency:  Daily    Does Pt have a detox care plan in [facility]? No  Does pt present with specific needs, assistive devices, or exclusionary criteria? None  Is the patient ambulating, eating and drinking in the ED? Yes    A: Pt meets criteria to be presented for IP detox admission. Patient is voluntary    COVID Symptoms: No  If yes, COVID test required   Utox: Positive for Barbituates  Magnesium: WNL  CMP: Abnormalities: Carbon Dioxide (CO2) 21  CBC: WNL  HCG: Negative     R: Patient cleared and ready for behavioral bed placement: Yes    Pt is meeting criteria for presentation to 3A/CD    Does Patient need a Transfer Center request created? No, Pt is located within [facility] ED, [facility] ED, or [facility]     10:37 AM Leonord Benedict.    10:47 AM Pt accepted to 3A/CD/Dr. Shane by Benedict.    10:54 AM No answer to 3A/CRN. Intake to cb.    10:54 AM Indicia complete. Pt added to admit board.    10:59 AM [facility] ED notified.    10:59 AM 3A/CRN informed of pt in queue.

## 2025-01-16 NOTE — ED PROVIDER NOTES
[facility] ([facility])    1/16/25      ED PROVIDER NOTE       History     Chief Complaint   Patient presents with    Addiction Problem     Pt seeking detox from prescription medication. Fioricet. Pt states she has been taking more of this medication in the last six months. Took fioricet 3:30am this morning.     HPI  Jennifer A Schwab is a 44 year old female with a history of chronic headaches (on Fioricet) who presents to the ED seeking detox from Fioricet.    Per Chart Review,  Patient presented to [place] ED on 1/11/25 for a medication refill. She stated that she takes it 4-6 times per day. Patient initially stated that her prescription had gotten stolen with her purse.  However, per chart review, patient had a ED visit on 12/17/2024 and she was prescribed 84 tablets of Fioricet to be refilled on 1/6/2025. Upon reevaluation, patient did admit to filling this prescription and having taken all 84 tablets within the last 5 days. She was discharged home with a prescription for Clonidine and Zofran and follow up with pcp.     Past Medical History  Past Medical History:   Diagnosis Date    Cervical high risk HPV (human papillomavirus) test positive 11/17/2016    GERD (gastroesophageal reflux disease)     History of anesthesia complications     Intussusception (H)     Intussusception intestine (H) 2013    PONV (postoperative nausea and vomiting)     Preeclampsia in postpartum period 2010    1 week after 2nd delivery     Past Surgical History:   Procedure Laterality Date    ABDOMEN SURGERY      INTUSSUSCEPTION REPAIR      [place] HYSTEROSCOPY,W/ENDO BX N/A 10/5/2018    Procedure: HYSTEROSCOPY, WITH DILATION AND CURETTAGE;  Surgeon: Dallas Elizabeth MD;  Location: [facility];  Service: Gynecology    AK HYSTEROSCOPY,W/ENDO BX N/A 12/9/2020    Procedure: HYSTEROSCOPY, WITH DILATION AND CURETTAGE, NOVASURE ABLATION;  Surgeon: Ortega Jackson MD;  Location: [facility];  Service:  Gynecology    MA LAP,TUBAL CAUTERY N/A 10/5/2018    Procedure: LAPAROSCOPIC TUBAL LIGATION, HYSTEROSCOPY, WITH DILATION AND CURETTAGE;  Surgeon: Dallas Elizabeth MD;  Location: [facility];  Service: Gynecology    SURGICAL HISTORY OF -   2013    intussusception    WISDOM TOOTH EXTRACTION       cetirizine (ZYRTEC) 10 MG tablet  cloNIDine (CATAPRES) 0.1 MG tablet  furosemide (LASIX) 20 MG tablet  ibuprofen (ADVIL/MOTRIN) 800 MG tablet  Lansoprazole (PREVACID PO)  nitrofurantoin, macrocrystal-monohydrate, (MACROBID) 100 MG capsule      Allergies   Allergen Reactions    Paper Tape [Adhesive Tape] Hives     Family History  Family History   Problem Relation Age of Onset    Down Syndrome Other         husbands 1/2 brother    Gastrointestinal Disease Daughter         severe constipation    Unknown/Adopted Father         unknown family history    Breast Cancer Maternal Grandmother     Diabetes Mother         type II before gastric bypass    Other Cancer Mother      Social History   Social History     Tobacco Use    Smoking status: Smoker, Current Status Unknown     Current packs/day: 0.25     Types: Cigarettes    Smokeless tobacco: Never    Tobacco comments:     smoking 20cig/day- down to 5 cig/day with pregnancy   Substance Use Topics    Alcohol use: No     Alcohol/week: 0.0 standard drinks of alcohol     Comment: rare- quit with pregnancy    Drug use: No      A medically appropriate review of systems was performed with pertinent positives and negatives noted in the HPI, and all other systems negative.    Physical Exam   BP: 133/85  Pulse: 98  Temp: 98  F (36.7  C)  Resp: 18  SpO2: 95 %  Physical Exam  Vitals and nursing note reviewed.   Constitutional:       General: She is not in acute distress.     Appearance: Normal appearance. She is not diaphoretic.   HENT:      Head: Atraumatic.      Mouth/Throat:      Mouth: Mucous membranes are moist.   Eyes:      General: No scleral icterus.     Conjunctiva/sclera:  Conjunctivae normal.   Cardiovascular:      Rate and Rhythm: Normal rate.      Heart sounds: Normal heart sounds.   Pulmonary:      Effort: No respiratory distress.      Breath sounds: Normal breath sounds.   Abdominal:      General: Abdomen is flat.   Musculoskeletal:      Cervical back: Neck supple.   Skin:     General: Skin is warm.      Findings: No rash.   Neurological:      General: No focal deficit present.      Mental Status: She is alert and oriented to person, place, and time.      Sensory: No sensory deficit.      Motor: No weakness.      Coordination: Coordination normal.   Psychiatric:         Mood and Affect: Mood is anxious.         Speech: Speech normal.         Behavior: Behavior is cooperative.         Thought Content: Thought content does not include homicidal or suicidal ideation.           ED Course, Procedures, & Data      Procedures     Results for orders placed or performed during the hospital encounter of 01/16/25   Comprehensive metabolic panel     Status: Abnormal   Result Value Ref Range    Sodium 138 135 - 145 mmol/L    Potassium 3.6 3.4 - 5.3 mmol/L    Carbon Dioxide (CO2) 21 (L) 22 - 29 mmol/L    Anion Gap 14 7 - 15 mmol/L    Urea Nitrogen 6.5 6.0 - 20.0 mg/dL    Creatinine 0.85 0.51 - 0.95 mg/dL    GFR Estimate 87 >60 mL/min/1.73m2    Calcium 9.5 8.8 - 10.4 mg/dL    Chloride 103 98 - 107 mmol/L    Glucose 97 70 - 99 mg/dL    Alkaline Phosphatase 103 40 - 150 U/L    AST 22 0 - 45 U/L    ALT 33 0 - 50 U/L    Protein Total 7.6 6.4 - 8.3 g/dL    Albumin 4.6 3.5 - 5.2 g/dL    Bilirubin Total 0.2 <=1.2 mg/dL   Magnesium     Status: Normal   Result Value Ref Range    Magnesium 2.3 1.7 - 2.3 mg/dL   Ethyl Alcohol Level     Status: Normal   Result Value Ref Range    Alcohol ethyl <0.01 <=0.01 g/dL   HCG qualitative urine (UPT)     Status: Normal   Result Value Ref Range    hCG Urine Qualitative Negative Negative   Urine Drug Screen Panel     Status: Abnormal   Result Value Ref Range     Amphetamines Urine Screen Negative Screen Negative    Barbituates Urine Screen Positive (A) Screen Negative    Benzodiazepine Urine Screen Negative Screen Negative    Cannabinoids Urine Screen Negative Screen Negative    Cocaine Urine Screen Negative Screen Negative    Fentanyl Qual Urine Screen Negative Screen Negative    Opiates Urine Screen Negative Screen Negative    PCP Urine Screen Negative Screen Negative   CBC with platelets and differential     Status: None   Result Value Ref Range    WBC Count 7.8 4.0 - 11.0 10e3/uL    RBC Count 4.32 3.80 - 5.20 10e6/uL    Hemoglobin 13.5 11.7 - 15.7 g/dL    Hematocrit 38.9 35.0 - 47.0 %    MCV 90 78 - 100 fL    MCH 31.3 26.5 - 33.0 pg    MCHC 34.7 31.5 - 36.5 g/dL    RDW 12.3 10.0 - 15.0 %    Platelet Count 363 150 - 450 10e3/uL    % Neutrophils 63 %    % Lymphocytes 28 %    % Monocytes 8 %    % Eosinophils 0 %    % Basophils 0 %    % Immature Granulocytes 0 %    NRBCs per 100 WBC 0 <1 /100    Absolute Neutrophils 4.9 1.6 - 8.3 10e3/uL    Absolute Lymphocytes 2.2 0.8 - 5.3 10e3/uL    Absolute Monocytes 0.7 0.0 - 1.3 10e3/uL    Absolute Eosinophils 0.0 0.0 - 0.7 10e3/uL    Absolute Basophils 0.0 0.0 - 0.2 10e3/uL    Absolute Immature Granulocytes 0.0 <=0.4 10e3/uL    Absolute NRBCs 0.0 10e3/uL   CBC with platelets differential     Status: None    Narrative    The following orders were created for panel order CBC with platelets differential.  Procedure                               Abnormality         Status                     ---------                               -----------         ------                     CBC with platelets and d...[[id number]]                      Final result                 Please view results for these tests on the individual orders.   Urine Drug Screen     Status: Abnormal    Narrative    The following orders were created for panel order Urine Drug Screen.  Procedure                               Abnormality         Status                      ---------                               -----------         ------                     Urine Drug Screen Panel[[id number]]      Abnormal            Final result                 Please view results for these tests on the individual orders.     Medications   LORazepam (ATIVAN) tablet 1 mg (has no administration in time range)     Labs Ordered and Resulted from Time of ED Arrival to Time of ED Departure   COMPREHENSIVE METABOLIC PANEL - Abnormal       Result Value    Sodium 138      Potassium 3.6      Carbon Dioxide (CO2) 21 (*)     Anion Gap 14      Urea Nitrogen 6.5      Creatinine 0.85      GFR Estimate 87      Calcium 9.5      Chloride 103      Glucose 97      Alkaline Phosphatase 103      AST 22      ALT 33      Protein Total 7.6      Albumin 4.6      Bilirubin Total 0.2     URINE DRUG SCREEN PANEL - Abnormal    Amphetamines Urine Screen Negative      Barbituates Urine Screen Positive (*)     Benzodiazepine Urine Screen Negative      Cannabinoids Urine Screen Negative      Cocaine Urine Screen Negative      Fentanyl Qual Urine Screen Negative      Opiates Urine Screen Negative      PCP Urine Screen Negative     MAGNESIUM - Normal    Magnesium 2.3     ETHYL ALCOHOL LEVEL - Normal    Alcohol ethyl <0.01     HCG QUALITATIVE URINE - Normal    hCG Urine Qualitative Negative     CBC WITH PLATELETS AND DIFFERENTIAL    WBC Count 7.8      RBC Count 4.32      Hemoglobin 13.5      Hematocrit 38.9      MCV 90      MCH 31.3      MCHC 34.7      RDW 12.3      Platelet Count 363      % Neutrophils 63      % Lymphocytes 28      % Monocytes 8      % Eosinophils 0      % Basophils 0      % Immature Granulocytes 0      NRBCs per 100 WBC 0      Absolute Neutrophils 4.9      Absolute Lymphocytes 2.2      Absolute Monocytes 0.7      Absolute Eosinophils 0.0      Absolute Basophils 0.0      Absolute Immature Granulocytes 0.0      Absolute NRBCs 0.0       No orders to display          Critical care was not performed.     Medical Decision  Making  The patient's presentation was of high complexity (a chronic illness severe exacerbation, progression, or side effect of treatment).    The patient's evaluation involved:  ordering and/or review of 3+ test(s) in this encounter (see separate area of note for details)    The patient's management necessitated moderate risk (limitations due to social determinants of health (substance use)) and high risk (a decision regarding hospitalization).    Assessment & Plan        I have reviewed the nursing notes. I have reviewed the findings, diagnosis, plan and need for follow up with the patient.    Patient with barbiturate dependence at this time will be admitted to station 3A for further treatment    Final diagnoses:   Barbiturate dependence (H)       Jaylen Cueto MD  [facility] EMERGENCY DEPARTMENT  1/16/2025     Jaylen Cueto MD  01/16/25 1054

## 2025-01-16 NOTE — PHARMACY-ADMISSION MEDICATION HISTORY
Pharmacist Admission Medication History    Admission medication history is complete. The information provided in this note is only as accurate as the sources available at the time of the update.    Information Source(s): Patient via phone    Pertinent Information: Pt stated that she last took her medications this morning at 4am. Verified frequency and dosing of all medications.     Changes made to PTA medication list:  Added: Amitriptyline, Fioricet, Vit D, hydroxyzine, Ondansetron, tizanidine  Deleted: Furosemide, ibuprofen, lansoprazole, nitrofurantoin  Changed: None    Allergies reviewed with patient and updates made in EHR: yes    Medication History Completed By: Hina Drake [facility] 1/16/2025 1:32 PM    PTA Med List   Medication Sig Last Dose/Taking    amitriptyline (ELAVIL) 10 MG tablet Take 10 mg by mouth at bedtime. 1/15/2025    butalbital-acetaminophen-caffeine (ESGIC) -40 MG tablet Take 2 tablets by mouth every 4 hours as needed for headaches. 1/16/2025    cetirizine (ZYRTEC) 10 MG tablet Take 10 mg by mouth every evening  1/16/2025 Morning    cloNIDine (CATAPRES) 0.1 MG tablet Take 1 tablet (0.1 mg) by mouth 3 times daily as needed (withdrawal symptoms, palpitations). 1/16/2025    hydrOXYzine HCl (ATARAX) 25 MG tablet Take 25 mg by mouth 3 times daily as needed for anxiety. 1/15/2025    ondansetron (ZOFRAN ODT) 4 MG ODT tab Take 4 mg by mouth daily. 1/16/2025    RABEprazole (ACIPHEX) 20 MG EC tablet Take 20 mg by mouth 2 times daily. 1/16/2025    tiZANidine (ZANAFLEX) 2 MG capsule Take 2 mg by mouth at bedtime. 1/15/2025    vitamin D2 (ERGOCALCIFEROL) 81249 units (1250 mcg) capsule Take 50,000 Units by mouth once a week. 1/12/2025
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
## 2025-01-17 PROCEDURE — 250N000013 HC RX MED GY IP 250 OP 250 PS 637: Performed by: PSYCHIATRY & NEUROLOGY

## 2025-01-17 PROCEDURE — 90853 GROUP PSYCHOTHERAPY: CPT

## 2025-01-17 PROCEDURE — 99233 SBSQ HOSP IP/OBS HIGH 50: CPT | Performed by: PSYCHIATRY & NEUROLOGY

## 2025-01-17 PROCEDURE — 250N000011 HC RX IP 250 OP 636: Performed by: PSYCHIATRY & NEUROLOGY

## 2025-01-17 PROCEDURE — 250N000013 HC RX MED GY IP 250 OP 250 PS 637: Performed by: STUDENT IN AN ORGANIZED HEALTH CARE EDUCATION/TRAINING PROGRAM

## 2025-01-17 PROCEDURE — 128N000004 HC R&B CD ADULT

## 2025-01-17 RX ORDER — IBUPROFEN 800 MG/1
800 TABLET, FILM COATED ORAL EVERY 8 HOURS PRN
Qty: 90 TABLET | Refills: 0 | Status: SHIPPED | OUTPATIENT
Start: 2025-01-17

## 2025-01-17 RX ORDER — DOCOSANOL 100 MG/G
CREAM TOPICAL 4 TIMES DAILY PRN
Qty: 2 G | Refills: 0 | Status: SHIPPED | OUTPATIENT
Start: 2025-01-17

## 2025-01-17 RX ORDER — MAGNESIUM OXIDE 400 MG/1
200 TABLET ORAL DAILY
COMMUNITY
Start: 2025-01-18

## 2025-01-17 RX ORDER — PHENOBARBITAL 32.4 MG/1
TABLET ORAL
Qty: 15 TABLET | Refills: 0 | Status: SHIPPED | OUTPATIENT
Start: 2025-01-19

## 2025-01-17 RX ORDER — RIBOFLAVIN (VITAMIN B2) 400 MG
400 TABLET ORAL DAILY
COMMUNITY
Start: 2025-01-18

## 2025-01-17 RX ORDER — SUMATRIPTAN SUCCINATE 100 MG/1
100 TABLET ORAL 2 TIMES DAILY PRN
Qty: 10 TABLET | Refills: 0 | Status: SHIPPED | OUTPATIENT
Start: 2025-01-17

## 2025-01-17 RX ORDER — DOCOSANOL 100 MG/G
CREAM TOPICAL 4 TIMES DAILY PRN
Status: DISCONTINUED | OUTPATIENT
Start: 2025-01-17 | End: 2025-01-19 | Stop reason: HOSPADM

## 2025-01-17 RX ORDER — PHENOBARBITAL 32.4 MG/1
TABLET ORAL
Qty: 10 TABLET | Refills: 0 | Status: SHIPPED | OUTPATIENT
Start: 2025-01-22 | End: 2025-01-25

## 2025-01-17 RX ORDER — HYDROXYZINE HYDROCHLORIDE 25 MG/1
25 TABLET, FILM COATED ORAL 3 TIMES DAILY PRN
Qty: 90 TABLET | Refills: 0 | Status: SHIPPED | OUTPATIENT
Start: 2025-01-17

## 2025-01-17 RX ORDER — ONDANSETRON 4 MG/1
4 TABLET, ORALLY DISINTEGRATING ORAL DAILY
Qty: 20 TABLET | Refills: 0 | Status: SHIPPED | OUTPATIENT
Start: 2025-01-17

## 2025-01-17 RX ADMIN — NICOTINE 1 PATCH: 21 PATCH, EXTENDED RELEASE TRANSDERMAL at 07:51

## 2025-01-17 RX ADMIN — PHENOBARBITAL 64.8 MG: 64.8 TABLET ORAL at 20:11

## 2025-01-17 RX ADMIN — NICOTINE POLACRILEX 2 MG: 2 GUM, CHEWING BUCCAL at 14:54

## 2025-01-17 RX ADMIN — Medication 200 MG: at 07:50

## 2025-01-17 RX ADMIN — ACETAMINOPHINE, CAFFEINE 2 TABLET: 500; 65 TABLET, FILM COATED ORAL at 06:18

## 2025-01-17 RX ADMIN — AMITRIPTYLINE HYDROCHLORIDE 10 MG: 10 TABLET, FILM COATED ORAL at 22:02

## 2025-01-17 RX ADMIN — TIZANIDINE 2 MG: 2 TABLET ORAL at 18:16

## 2025-01-17 RX ADMIN — HYDROXYZINE HYDROCHLORIDE 25 MG: 25 TABLET, FILM COATED ORAL at 01:54

## 2025-01-17 RX ADMIN — IBUPROFEN 800 MG: 400 TABLET, FILM COATED ORAL at 09:28

## 2025-01-17 RX ADMIN — HYDROXYZINE HYDROCHLORIDE 25 MG: 25 TABLET, FILM COATED ORAL at 18:15

## 2025-01-17 RX ADMIN — NICOTINE POLACRILEX 2 MG: 2 GUM, CHEWING BUCCAL at 12:45

## 2025-01-17 RX ADMIN — NICOTINE POLACRILEX 2 MG: 2 GUM, CHEWING BUCCAL at 08:12

## 2025-01-17 RX ADMIN — SUMATRIPTAN SUCCINATE 100 MG: 50 TABLET ORAL at 14:21

## 2025-01-17 RX ADMIN — NICOTINE POLACRILEX 2 MG: 2 GUM, CHEWING BUCCAL at 18:16

## 2025-01-17 RX ADMIN — HYDROXYZINE HYDROCHLORIDE 25 MG: 25 TABLET, FILM COATED ORAL at 08:40

## 2025-01-17 RX ADMIN — Medication 400 MG: at 07:50

## 2025-01-17 RX ADMIN — TRAZODONE HYDROCHLORIDE 50 MG: 50 TABLET ORAL at 22:02

## 2025-01-17 RX ADMIN — CETIRIZINE HYDROCHLORIDE 10 MG: 5 TABLET ORAL at 07:50

## 2025-01-17 RX ADMIN — PHENOBARBITAL 64.8 MG: 64.8 TABLET ORAL at 16:18

## 2025-01-17 RX ADMIN — PHENOBARBITAL 64.8 MG: 64.8 TABLET ORAL at 12:00

## 2025-01-17 RX ADMIN — HYDROXYZINE HYDROCHLORIDE 25 MG: 25 TABLET, FILM COATED ORAL at 12:43

## 2025-01-17 RX ADMIN — IBUPROFEN 800 MG: 400 TABLET, FILM COATED ORAL at 01:54

## 2025-01-17 RX ADMIN — ONDANSETRON 4 MG: 4 TABLET, ORALLY DISINTEGRATING ORAL at 06:18

## 2025-01-17 RX ADMIN — SUMATRIPTAN SUCCINATE 100 MG: 50 TABLET ORAL at 01:53

## 2025-01-17 RX ADMIN — IBUPROFEN 800 MG: 400 TABLET, FILM COATED ORAL at 22:02

## 2025-01-17 RX ADMIN — CLONIDINE HYDROCHLORIDE 0.1 MG: 0.1 TABLET ORAL at 14:21

## 2025-01-17 RX ADMIN — RABEPRAZOLE SODIUM 20 MG: 20 TABLET, DELAYED RELEASE ORAL at 16:20

## 2025-01-17 RX ADMIN — CETIRIZINE HYDROCHLORIDE 10 MG: 5 TABLET ORAL at 20:11

## 2025-01-17 RX ADMIN — PHENOBARBITAL 64.8 MG: 64.8 TABLET ORAL at 07:50

## 2025-01-17 RX ADMIN — TIZANIDINE 2 MG: 2 TABLET ORAL at 22:02

## 2025-01-17 RX ADMIN — NICOTINE POLACRILEX 2 MG: 2 GUM, CHEWING BUCCAL at 20:11

## 2025-01-17 RX ADMIN — RABEPRAZOLE SODIUM 20 MG: 20 TABLET, DELAYED RELEASE ORAL at 07:51

## 2025-01-17 RX ADMIN — NICOTINE POLACRILEX 2 MG: 2 GUM, CHEWING BUCCAL at 16:18

## 2025-01-17 RX ADMIN — ACETAMINOPHINE, CAFFEINE 2 TABLET: 500; 65 TABLET, FILM COATED ORAL at 16:18

## 2025-01-17 RX ADMIN — ONDANSETRON 4 MG: 4 TABLET, ORALLY DISINTEGRATING ORAL at 16:18

## 2025-01-17 ASSESSMENT — ACTIVITIES OF DAILY LIVING (ADL)
ADLS_ACUITY_SCORE: 22

## 2025-01-17 NOTE — DISCHARGE INSTRUCTIONS
Behavioral Discharge Planning and Instructions  THANK YOU FOR CHOOSING [address]  [phone]    Summary: You were admitted to [facility] on 01/16/2025 for detoxification from Barbiturates .  A medical exam was performed that included lab work. You have met with a [facility] Counselor and opted to continue to follow recommendations from your medical provider .  Please take care and make your recovery a daily priority, Sheri!  It was a pleasure working with you and the entire treatment team here wishes you the very best in your recovery!     Recommendation:  Please continue to follow all recommendations from your medical provider.    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 You have met with a [facility] counselor to develop a treatment plan for discharge. You had labs drawn and those results were reviewed with you. Please take a copy of your lab work with you to your next primary care provider appointment.    Symptoms to Report:  If you experience more anxiety, confusion, sleeplessness, deep sadness or thoughts of suicide, notify your treatment team or notify your primary care provider. IF ANY OF THE SYMPTOMS YOU ARE EXPERIENCING ARE A MEDICAL EMERGENCY CALL 911 IMMEDIATELY.     Lifestyle Adjustment: Adjust your lifestyle to get enough sleep, relaxation, exercise and good nutrition. Continue to develop healthy coping skills to decrease stress and promote a sober living environment. Do not use mood altering substances including alcohol, illegal drugs or addictive medications other than what is currently prescribed.     Disposition: Return home    Facts about COVID19 at www.cdc.gov/COVID19 and www.[place].gov/covid19    Keeping hands clean is one of the most important steps we can take to avoid getting sick and spreading germs to others.  Please wash your hands  frequently and lather with soap for at least 20 seconds!    Follow-up Appointment:     Per conversation, you were told by your provider to email him once you get home and he will secure an appointment for you on Tuesday 01/19/2025.    Appointment Date/Time: 01/21/2025      Psychiatrist/Primary Care Giver: Dr. Dave Rosado      [facility]  Address: [address] [place] [address], [place] [address]    Phone Number: [phone]      Recovery apps for your phone for educational purposes and to locate in person and zoom recovery meetings  Everything AA -  kathleen is a great resource  12 Step Toolkit - educational purpose learning about the 12 steps to recovery  [place] [employer] - meeting kathleen  AA  - meeting kathleen  Meeting guide - meeting kathleen  Quick NA meeting - meeting kathleen  Mookie- has various apps    Resources:  AA/NA meetings have returned to in-person or a hybrid combination of zoom/in-person therefore please check online to verify*  Need Support Now? If you or someone you know is struggling or in crisis, help is available. Call or text [employer] or chat [employer]  AA meetings search for them at: https://aa-intergroup.org (worldwide meeting listings)  AA meetings for [place] area can be found online at: https://a[place].org (click local online meetings listings)  NA meetings for [place] area can be found online at: https://www.nam[place].org  (click find a meeting)  AA and NA Sponsors are excellent resources for support and you can find one at any support group meeting.   Alcoholics Anonymous (https://aa.org/): for information 24 hours/day  AA Intergroup service office in [place] (http://www.aastpaul.org/) [phone]  AA Intergroup service office in [facility]: [phone]. (http://www.a[place].org/)  Narcotics Anonymous (www.nam[place].org) (488) 350-9395  https://aafairviewriverside.org/meetings  SMART Recovery - self management for addiction recovery:  www.smartrecovery.org  Pathways ~ A  "Health Crisis Resource & Support Center:  985.658.9966.  https://prescribetoprevent.org/patient-education/videos/  http://www.harmreduction.org  Crisis Text Line  Text 447870  You will be connected with a trained live crisis counselor to provide support. Por espanol, texto  ERROL a 094957 o texto a 442-AYUDAME en WhatsApp  [facility]  1.800.SUICIDE [2922894]  [facility] 671-173-5168  Support Group:  AA/NA and Sponsor/support.  Fast Tracker  Linking people to mental health and substance use disorder resources  [employer] FinancetrackAdann.org   [place] Mental Health Warm Line  Peer to peer support  Monday thru Saturday, 12 pm to 10 pm  982.471.4293 or 6.606.018.7466  Text \"Support\" to 91019  National [place] on Mental Illness (SVETLANA)  521.120.6102 or 1.888.SVETLANA.HELPS  Alcoholics Anonymous (www.alcoholics-anonymous.org): Check your phone book for your local chapter.  Suicide Awareness Voices of Education (SAVE) (www.save.org): 940-025-YEXX (7283)  National Suicide Prevention Line (www.mentalhealthmn.org): 150-228-TACD (2482)  Mental Health Consumer/Survivor Network of [place] (www.mhcsn.net): 552.586.6814 or 117-939-6532  Mental Health Association of [place] (www.mentalhealth.org): 204.508.7415 or 490-055-6065   Substance Abuse and Mental Health Services (www.samhsa.gov)  [place] Opioid Prevention Coalition: www.opioidcoalition.org    [place] Recovery Connection (MRC)  [facility] connects people seeking recovery to resources that help foster and sustain long-term recovery.  Whether you are seeking resources for treatment, transportation, housing, job training, education, health care or other pathways to recovery, [facility] is a great place to start.  850.392.7832.  www.[place]arec[place]VI Video Shoppingy.org    Great Pod casts for nutrition and wellness  Listen on Apple Podcasts  Dishing Up Nutrition   [employer]   Nutrition       Understand the connection between what you eat and how you feel. Hosted by " licensed nutritionists and dietitians from [employer] we share practical, real-life solutions for healthier living through nutrition.     General Medication Instructions:   See your medication sheet(s) for instructions.   Take all medications as prescribed.  Make no changes unless your primary care provider suggests them.   Go to all your primary care provider visits.  Be sure to have all your required lab tests. This way, your medicines can be refilled on time.  Do not use any forms of alcohol.    Please Note: If you have any questions at anytime after you discharged please call [facility] detox unit 3A at [phone].    Here are a list of additional numbers you can call if you are wanting to resume services through [facility]:  [facility] Assessment Intake: [phone]  [facility] Adult [facility] Intensive Outpatient  call: [phone]  [facility] Admissions [phone]    Recovery Clinic call: [phone]  [facility]: [phone]  Medical Records call: [phone]  Billing Department call: [phone]    Please remember to take all of your behavioral discharge planning and lab paperwork to any follow up appointments, it contains your lab results, diagnosis, medication list and discharge recommendations.      THANK YOU FOR CHOOSING [facility]

## 2025-01-17 NOTE — PLAN OF CARE
Problem: Pain Chronic (Persistent)  Goal: Optimal Pain Control and Function  Intervention: Develop Pain Management Plan  Recent Flowsheet Documentation  Taken 1/17/2025 0618 by Demarcus Barajas, RN  Pain Management Interventions:   medication (see MAR)   cold applied  Taken 1/17/2025 0154 by Demarcus Barajas, RN  Pain Management Interventions:   medication (see MAR)   cold applied   Goal Outcome Evaluation:    MSSA score of 6, patient denies any symptoms of barbiturate withdrawal other than some mild nausea. Patient does complain of migraine headache @0200 and receives hydroxyzine 25mg, Imitrex 100mg and Ibuprofen 800 with good results. Patient later requests and receives acetaminophen/caffeine 500/65, 2 tablets for residual headache pain,as well as Zofran ODT 4mg for nausea. Patient is observed to otherwise sleep throughout the noc arising early as is her habit.

## 2025-01-17 NOTE — PLAN OF CARE
Problem: Substance Withdrawal  Goal: Substance Withdrawal  Description: Signs and symptoms of listed problems will be absent or manageable.  Outcome: Progressing   Goal Outcome Evaluation:    Plan of Care Reviewed With: patient      Patient alert and oriented x 4. She was pleasant on approach. She c/o headache 3/10 - declined PRN. She was tearful during the conversation and shared that she misses her kids. She reported low level of anxiety and depression. She denies withdrawal symptoms. MSSA 5,4. She's eating and drinking adequately. She's visible in the milieu, social with staff and other patients. Patient had multiple requests.    PRN given:    Nicotine gum 3x  Hydroxyzine for anxiety 2x  Ibuprofen for headache 4/10  Imitrex for migraine 2/10 (patient received the maximum dose for today)  Clonidine for palpitations    All PRN were effective per patient.     Warm and cold packs given for pain relief.     Temp: 98.3  F (36.8  C) Temp src: Oral BP: (!) 143/86 Pulse: 92   Resp: 16 SpO2: 98 %

## 2025-01-17 NOTE — PROGRESS NOTES
"CLINICAL NUTRITION SERVICES - ASSESSMENT NOTE     Nutrition Prescription    RECOMMENDATIONS FOR MDs/PROVIDERS TO ORDER:  None at this time    Malnutrition Status:    Patient does not meet two of the established criteria necessary for diagnosing malnutrition    Recommendations already ordered by Registered Dietitian (RD):  None at this time    Future/Additional Recommendations:  RD to sign off at this time, but will remain available by consult if new nutrition problem arises.       REASON FOR ASSESSMENT  Jennifer A Schwab is a/an 44 year old female assessed by the dietitian for Admission Nutrition Risk Screen for positive decreased appetite and weight loss, \"pt reports poor intake since Saturday 1/11/25 due to anxiety, nausea.\"    CLINICAL HISTORY  PMHx significant for migraines, abnormal uterine bleeding and Fioricet abuse/dependence. Admitted from ED 1/16/25 seeking detox from Fioricet (barbiturates).     NUTRITION HISTORY  RD met with Sheri at bedside who reports poor appetite/intake for a few days PTA d/t attempting to taper medication at home. Since admission, pt states appetite is improving with every meals. Pt has no requests or concerns at this time.     GI: chronic nausea 2/2 migraines    CURRENT NUTRITION ORDERS  Diet: Regular  Supplement: none  Allergies: NKFA  Intake/Tolerance: eating and drinking adequately per RN notes    LABS  Reviewed     MEDICATIONS  Mag-ox, vit B2, atarax prn, zofran prn    ANTHROPOMETRICS  Height: 157.5 cm (5' 2\")  Most Recent Weight:      IBW: 50 kg   BMI: Overweight BMI 25-29.9  Weight History:   Wt Readings from Last 15 Encounters:   01/13/25 72.8 kg (160 lb 9.6 oz)      01/11/25 73 kg (161 lb)   12/09/20 77.1 kg (170 lb)   10/05/18 70.8 kg (156 lb)   06/19/17 85.9 kg (189 lb 6.4 oz)   06/17/17 81.6 kg (180 lb)   06/13/17 84.6 kg (186 lb 9.6 oz)   06/06/17 83.9 kg (185 lb)   05/30/17 84.8 kg (187 lb)   05/16/17 83.6 kg (184 lb 3.2 oz)   05/02/17 83.9 kg (185 lb)   04/18/17 " 82.6 kg (182 lb)   04/04/17 82.6 kg (182 lb 3.2 oz)   03/02/17 81.5 kg (179 lb 9.6 oz)   02/02/17 79.3 kg (174 lb 12.8 oz)   01/03/17 79.7 kg (175 lb 9.6 oz)   Limited recent wt hx available    Dosing Weight: 55 kg (adjusted BW)    ASSESSED NUTRITION NEEDS  Estimated Energy Needs: 1375 - 1650 kcals/day (25 - 30 kcals/kg)  Justification: Obese  Estimated Protein Needs: 57 - 72 grams protein/day (0.8 - 1 grams of pro/kg)  Justification: Obesity guidelines  Estimated Fluid Needs: 1 mL/kcal  Justification: Maintenance or Per Provider    PHYSICAL FINDINGS  See malnutrition section below.  Jair: 23  No abnormal nutrition-related physical findings observed.     MALNUTRITION  % Intake: No decreased intake noted  % Weight Loss: Unable to assess  Subcutaneous Fat Loss: None observed  Muscle Loss: None observed  Fluid Accumulation/Edema: None noted  Malnutrition Diagnosis: Patient does not meet two of the established criteria necessary for diagnosing malnutrition    NUTRITION DIAGNOSIS  No nutrition diagnosis at this time     INTERVENTIONS  Implementation  Nutrition Education: RD role in care   Modify composition of meals/snacks - pt declined    Goals  Patient to consume % of nutritionally adequate meal trays TID, or the equivalent with supplements/snacks.     Monitoring/Evaluation  RD to sign off at this time, but will remain available by consult if new nutrition problem arises.      Maile Gonsales MPH, RDN, LD  Behavioral Health Adult & Pediatric Dietitian  BEH Clinical Dietitian Vocera, Teams, or Desk: [phone]  Weekend/Holiday Vocera: Weekend Holiday Clinical Dietitian [Multi Site Groups]

## 2025-01-17 NOTE — PROGRESS NOTES
[facility]     Case Management Encounter: Met with team, discussed patient progress, discharge plan and any impediments to discharge.    Pt was admitted to  for a medical detox from Fioricet. Pt does not struggle with substance abuse. Pt was prescribed Fioicet to manage chronic migraines, the medication has not been helpful but due to it containing barbiturates she needs to medically detoxed to prevent withdrawal.     Insurance: [employer]     Legal Status:  Voluntary   County: NA  File Number: NA  Start and expiration date of commitment: NA    SUDs Assessment Status: Not needed     ROIs on file: None     Living Situation: Pt lives in [place] with  and 4 children     Current Providers, Supports & Collateral:  PCP, Family    Current Plan/Referral Status: Return home       MONE Lee  [facility] - Adult Inpatient Addiction Psychiatry Unit

## 2025-01-17 NOTE — PLAN OF CARE
Behavioral Team Discussion: (1/17/2025)    Continued Stay Criteria/Rationale: Patient admitted for Chemical Use Issues.  Plan: The following services will be provided to the patient; psychiatric assessment, medication management, therapeutic milieu, individual and group support, and skills groups.   Participants: 3A Provider: Dr. Leola Shane MD; 3A RN: Christal Watson Naa, ; 3A CM's:  MONE Lee .  Summary/Recommendation: Providers will assess today for treatment recommendations, discharge planning, and aftercare plans. CM will meet with pt for discharge planning.   Medical/Physical: No acute medical concerns reported.   Precautions:   Behavioral Orders   Procedures    Code 1 - Restrict to Unit    Routine Programming     As clinically indicated    Status 15     Every 15 minutes.    Withdrawal precautions     Rationale for change in precautions or plan: N/A  Progress: Initial.    ASAM Dimension Scale Ratings:  Dimension 1: 3 Client tolerates and javier with withdrawal discomfort poorly. Client has severe intoxication, such that the client endangers self or others, or intoxication has not abated with less intensive levels of services. Client displays severe signs and symptoms; or risk of severe, but manageable withdrawal; or withdrawal worsening despite detox at less intensive level.  Dimension 2: 1 Client tolerates and javier with physical discomfort and is able to get the services that the client needs.  Dimension 3: 2 Client has difficulty with impulse control and lacks coping skills. Client has thoughts of suicide or harm to others without means; however, the thoughts may interfere with participation in some treatment activities. Client has difficulty functioning in significant life areas. Client has moderate symptoms of emotional, behavioral, or cognitive problems. Client is able to participate in most treatment activities.  Dimension 4: 2 Client displays verbal compliance, but lacks consistent  behaviors; has low motivation for change; and is passively involved in treatment.  Dimension 5: 4 No awareness of the negative impact of mental health problems or substance abuse. No coping skills to arrest mental health or addiction illnesses, or prevent relapse.  Dimension 6: 3 Client is not engaged in structured, meaningful activity and the client's peers, family, significant other, and living environment are unsupportive, or there is significant criminal justice system involvement.

## 2025-01-17 NOTE — PLAN OF CARE
Goal Outcome Evaluation:    Plan of Care Reviewed With: patient        Pt visible in the milieu, socialized with peers while watching TV, attended and participated in group activities and made and received phone calls. Pt denied all mental health psych symptoms and committed for safety. Pt on fioricet withdrawal and on scheduled phenobarbital. Pt scored 5 and 6 for MSSA. Pt received prn imitrix for headache without improvement and prn ibuprofen administered with effectiveness per pt. Prn zofran also give for nausea with effectiveness.   Adequate fluids and food intake, voiding freely and no BM concern per pt. Pt denied shortness of breath and pain and vital sign stable except mildly elevated BP and pt asymptomatic.  BP (!) 147/94 (BP Location: Left arm)   Pulse 90   Temp 97.9  F (36.6  C) (Temporal)   Resp 16   SpO2 100%

## 2025-01-17 NOTE — PROGRESS NOTES
"[facility], [place]   Psychiatric Progress Note  Hospital Day: 1        Interim History:   The patient's care was discussed with the treatment team during the daily team meeting and/or staff's chart notes were reviewed.  Staff report patient has been visible in the milieu, socialized with peers, taking medications as prescribed, MSSA scores 5, 6, tolerating phenobarbital, having ongoing headache with some relief with PRN medications, BP and HR elevated, reporting some ear discomfort, intake adequate, denying MH symptoms including SI, appeared to sleep throughout the night.     Upon interview, the patient reports that she is \"doing the best I can\" she got some sleep overnight, she continues to have a headache but it actually had improvement with the use of ibuprofen, does not feel like the Imitrex necessarily helps.  Reviewed options for discharge and she plans to follow up with her PCP and get another referral for neurology to discuss additional options as recommended by internal medicine.  She continues to have a lot of anxiety, she is not sure if it is related to the withdrawal or if it is due to just being admitted to the unit.  Denies any other withdrawal symptoms.  Inquired about the report from the nursing staff that she was having some ear discomfort over a \"whooshing\" sound, she reports she has experiences that in the past when she has had issues with her sinuses or infections but that this has resolved this morning she is no longer experiencing it.  She will let staff know if it recurs and internal medicine can be contacted to evaluate further.  She denies any SI HI or AVH.  She continues to express interest in wanting to be able to discharge soon as possible, discussed release discharge could be on Sunday and that a phenobarbital taper would be ordered where she received part of her taper on discharge from the unit and would have to  the rest of her taper from her home " pharmacy.  She expressed understanding and is in agreement with this plan as long as she continues to maintain stability in her withdrawal/detox process. She also is noticing a cold sore on her mouth, willing to try some topical cream. No additional concerns.          Medications:     Current Facility-Administered Medications   Medication Dose Route Frequency Provider Last Rate Last Admin    amitriptyline (ELAVIL) tablet 10 mg  10 mg Oral At Bedtime Leola Shane MD   10 mg at 01/16/25 2129    cetirizine (zyrTEC) tablet 10 mg  10 mg Oral BID Leola Shane MD   10 mg at 01/16/25 2017    magnesium oxide (MAG-OX) half-tab 200 mg  200 mg Oral Daily Leola Shane MD   200 mg at 01/16/25 1340    nicotine (NICODERM CQ) 21 MG/24HR 24 hr patch 1 patch  1 patch Transdermal Daily Leola Shane MD   1 patch at 01/16/25 1318    PHENobarbital tablet 64.8 mg  64.8 mg Oral 4x Daily Leola Shane MD   64.8 mg at 01/16/25 2017    RABEprazole (ACIPHEX) EC tablet 20 mg  20 mg Oral BID Leola Shane MD   20 mg at 01/16/25 1840    tiZANidine (ZANAFLEX) tablet 2 mg  2 mg Oral At Bedtime Leola Shane MD   2 mg at 01/16/25 2129    vitamin B-2 (RIBOFLAVIN) tablet 400 mg  400 mg Oral Daily Leola Shane MD   400 mg at 01/16/25 1619    [START ON 1/19/2025] vitamin D2 (ERGOCALCIFEROL) 79702 units (1250 mcg) capsule 50,000 Units  50,000 Units Oral Weekly Leola Shane MD              Allergies:     Allergies   Allergen Reactions    Paper Tape [Adhesive Tape] Hives          Labs:     Recent Results (from the past 48 hours)   HCG qualitative urine (UPT)    Collection Time: 01/16/25  9:14 AM   Result Value Ref Range    hCG Urine Qualitative Negative Negative   Urine Drug Screen Panel    Collection Time: 01/16/25  9:14 AM   Result Value Ref Range    Amphetamines Urine Screen Negative Screen Negative    Barbituates Urine Screen Positive (A) Screen Negative    Benzodiazepine Urine Screen Negative  Screen Negative    Cannabinoids Urine Screen Negative Screen Negative    Cocaine Urine Screen Negative Screen Negative    Fentanyl Qual Urine Screen Negative Screen Negative    Opiates Urine Screen Negative Screen Negative    PCP Urine Screen Negative Screen Negative   Comprehensive metabolic panel    Collection Time: 01/16/25  9:21 AM   Result Value Ref Range    Sodium 138 135 - 145 mmol/L    Potassium 3.6 3.4 - 5.3 mmol/L    Carbon Dioxide (CO2) 21 (L) 22 - 29 mmol/L    Anion Gap 14 7 - 15 mmol/L    Urea Nitrogen 6.5 6.0 - 20.0 mg/dL    Creatinine 0.85 0.51 - 0.95 mg/dL    GFR Estimate 87 >60 mL/min/1.73m2    Calcium 9.5 8.8 - 10.4 mg/dL    Chloride 103 98 - 107 mmol/L    Glucose 97 70 - 99 mg/dL    Alkaline Phosphatase 103 40 - 150 U/L    AST 22 0 - 45 U/L    ALT 33 0 - 50 U/L    Protein Total 7.6 6.4 - 8.3 g/dL    Albumin 4.6 3.5 - 5.2 g/dL    Bilirubin Total 0.2 <=1.2 mg/dL   Magnesium    Collection Time: 01/16/25  9:21 AM   Result Value Ref Range    Magnesium 2.3 1.7 - 2.3 mg/dL   Ethyl Alcohol Level    Collection Time: 01/16/25  9:21 AM   Result Value Ref Range    Alcohol ethyl <0.01 <=0.01 g/dL   CBC with platelets and differential    Collection Time: 01/16/25  9:21 AM   Result Value Ref Range    WBC Count 7.8 4.0 - 11.0 10e3/uL    RBC Count 4.32 3.80 - 5.20 10e6/uL    Hemoglobin 13.5 11.7 - 15.7 g/dL    Hematocrit 38.9 35.0 - 47.0 %    MCV 90 78 - 100 fL    MCH 31.3 26.5 - 33.0 pg    MCHC 34.7 31.5 - 36.5 g/dL    RDW 12.3 10.0 - 15.0 %    Platelet Count 363 150 - 450 10e3/uL    % Neutrophils 63 %    % Lymphocytes 28 %    % Monocytes 8 %    % Eosinophils 0 %    % Basophils 0 %    % Immature Granulocytes 0 %    NRBCs per 100 WBC 0 <1 /100    Absolute Neutrophils 4.9 1.6 - 8.3 10e3/uL    Absolute Lymphocytes 2.2 0.8 - 5.3 10e3/uL    Absolute Monocytes 0.7 0.0 - 1.3 10e3/uL    Absolute Eosinophils 0.0 0.0 - 0.7 10e3/uL    Absolute Basophils 0.0 0.0 - 0.2 10e3/uL    Absolute Immature Granulocytes 0.0 <=0.4  10e3/uL    Absolute NRBCs 0.0 10e3/uL   TSH with free T4 reflex    Collection Time: 01/16/25  9:21 AM   Result Value Ref Range    TSH 0.82 0.30 - 4.20 uIU/mL   EKG 12-lead, tracing only    Collection Time: 01/16/25 10:47 AM   Result Value Ref Range    Systolic Blood Pressure  mmHg    Diastolic Blood Pressure  mmHg    Ventricular Rate 92 BPM    Atrial Rate 92 BPM    OK Interval 118 ms    QRS Duration 86 ms     ms    QTc 452 ms    P Axis 60 degrees    R AXIS 20 degrees    T Axis 29 degrees    Interpretation ECG       Sinus rhythm  Normal ECG  Unconfirmed report - interpretation of this ECG is computer generated - see medical record for final interpretation    Confirmed by - EMERGENCY ROOM, PHYSICIAN ([id number]),  Berenice Dickerson ([id number]) on 1/16/2025 11:42:11 AM            Psychiatric Examination:     /86 (BP Location: Right arm, Patient Position: Sitting, Cuff Size: Adult Regular)   Pulse 104   Temp 97.7  F (36.5  C) (Temporal)   Resp 18   SpO2 99%   Weight is 0 lbs 0 oz  There is no height or weight on file to calculate BMI.    Orthostatic Vitals       None          Appearance: awake, alert and adequately groomed  Attitude:  cooperative  Eye Contact:  good  Mood:  anxious  Affect:  appropriate and in normal range and mood congruent, easily tearful when discussing anxiety or missing her children   Speech:  clear, coherent and normal prosody  Language: fluent and intact in English  Psychomotor, Gait, Musculoskeletal:  no evidence of tardive dyskinesia, dystonia, or tics  Thought Process:  logical, linear, and goal oriented  Associations:  no loose associations  Thought Content:  no evidence of suicidal ideation or homicidal ideation and no evidence of psychotic thought  Insight:  fair  Judgement:  intact  Oriented to:  time, person, and place  Attention Span and Concentration:  intact  Recent and Remote Memory:  intact  Fund of Knowledge:  appropriate           Precautions:     Behavioral Orders  "  Procedures    Code 1 - Restrict to Unit    Routine Programming     As clinically indicated    Status 15     Every 15 minutes.    Withdrawal precautions          Diagnoses:     Barbiturate use disorder, severe, dependence with withdrawal with complication   Nicotine dependence with withdrawal   Unspecified anxiety   Insomnia, unspecified  Migraines   Chronic pain   Elevated BP  GERD  Vit D deficiency   Abnormal uterine bleeding with recent ultrasound with concern for adenomyosis  Ear discomfort 1/16, improved 1/17    Clinically Significant Risk Factors Present on Admission                     # Hypertension: Home medication list includes antihypertensive(s)             # Overweight: Estimated body mass index is 29.45 kg/m  as calculated from the following:    Height as of 1/11/25: 1.575 m (5' 2\").    Weight as of 1/11/25: 73 kg (161 lb).                        Assessment & Plan:   Assessment and hospital summary:  This patient is a 44 year old female with history of migraines and Fiorcet abuse/dependence who presented to ED seeking detox from Fioricet (barbiturates). Medically cleared in ED, admitted to  as voluntary patient. Taking as much as 20 tablets of Fiorcet (butalbital 50mg-acetaminophen 325mg-caffeine 40mg) daily, recently around 13 tabs/day; EtOH <0.01, UDS positive for barbiturates, denies other substance use outside of nicotine/cigarettes. MSSA with phenobarbital started for barbiturate withdrawal. Withdrawal precautions in place, denies history of seizures. Admission labs ordered and reviewed those resulted. IM consult placed. Reporting anxiety due to withdrawal, denying safety concerns including SI and HI. PTA medications continued as appropriate. Pt reports goals for hospitalization are to complete detox and discharge home as soon as possible. Pt tolerating phenobarbital dosing, MSSA scores <8, will plan to start taper on Sunday and pt to possibly discharge home Sunday afternoon if ongoing " stabilization continues, medications ordered for discharge including first half of phenobarbital taper from discharge pharmacy and second half sent to pts home pharmacy.      Inpatient psychiatric hospitalization is warranted at this time for safety, stabilization, and possible adjustment in medications.    Psychiatric treatment/interventions:  Medications:   -continue MSSA with phenobarbital 64.8mg QID x3 days then will taper by 32.4mg daily starting on Sunday 1/19 for barbituate withdrawal   -continue PTA amitriptyline 10mg at bedtime for sleep and migraines  -continue PTA tizanidine 2mg at bedtime for pain with additional PRN dose available   -continue PTA Magnesium 200mg daily (recommended to take 250mg by PCP) for migraines  -continue PTA B2 400mg daily as recommended by PCP for migraines   -continue PTA Vit D 97402U weekly for Vit D deficiency   -continue PTA clonidine 0.1mg TID PRN withdrawal symptoms, hold parameters in place   -PRN hydroxyzine 25mg every 4 hours for anxiety   -PRN trazodone 50mg at bedtime for sleep   -PRN olanzapine 10mg PO or IM TID for agitation   -Nicotine replacement therapy ordered on unit and will offer on discharge and educate patient on benefits of cessation   -patient interested in detox only, she is wanting to discharge as soon as taper has been started, plan for patient to discharge on Sunday at 1pm (after she receives her second phenobarbital dose at 12p) and  will pick her up       The risks, benefits, alternatives and side effects have been discussed and are understood by the patient.     Laboratory/Imaging: reviewed since admission, no new labs ordered     Patient will be treated in therapeutic milieu with appropriate individual and group therapies as described.     Medical treatment/interventions:  Medical concerns:   1) Barbiturate withdrawal, ongoing  -continue MSSA and phenobarbital as above  -PRN zofran and imodium for GI distress related to withdrawal    -withdrawal precautions     2) IM consult placed for management of other medical concerns, per consult note on 1/1/6/25:   Jennifer A Schwab is a 44 year old female with PMHx significant for migraines, abnormal uterine bleeding.  Patient was admitted to station 3A for detox. Internal medicine consulted for medical co-management.     # Barbiturate Withdrawal   # Migraines   Reviewed PCP note from yesterday - PCP advised pt come to detox due to taking up to 17-18 Fioricet (butalbital 50 mg, acetaminophen 325 mg, and caffeine 40 mg) tablets daily. Per information provided to the ED provider, patient had a ED visit on 12/17/2024 and she was prescribed 84 tablets of Fioricet to be refilled on 1/6/2025. Upon reevaluation, patient did admit to filling this prescription and having taken all 84 tablets within the last 5 days prior to admission. LFTs checked due to pt taking excessive amounts of acetaminophen and were normal.  On discussion with patient, she reports being started on topiramate 2 years ago which did not work for her migraines and then she was transitioned to Fioricet as a second line by her PCP.  Fioricet never worked great for her but she reports she was told to continue just taking a higher dose by her PCP, which is ultimately led to her current situation.  She has never tried a triptan or CRGP inhibitor. She was recently started on Amitriptyline 10 mg as a preventative medications  - Withdrawal management per Psychiatry               - Starting Phenobarbital 64.8 mg QID   - Continue with Amitriptyline 10 mg daily   - Will start Sumatriptan 100 mg PO PRN for onset of migraine. May repeat dose in 2 hours if no relief.  Do not exceed 2 doses in 24 hours.  - Pt may be ultimately benefit from neurology consult or referral to headache specialist as outpatient      # Hypertension in setting of withdrawal   - treat underlying withdrawal   - Hydralazine 10 mg PO q 4 hours PRN for SBP > 180 or DBP >120     #  Insomnia   Pt reports difficulty sleeping for which she takes tizanidine for. I suspect part of her trouble sleeping is the Caffeine in Fiorcet.   - Recommend against tizanidine for insomnia is a muscle relaxant   - Defer management to psychiatry      # GERD  - PTA Rabeprazole 20 mg BID - pt's  to bring in pt's home suppley     # tobacco use disorder   - Nicotine patch      # Abnormal uterine bleeding   Ongoing issue for which she has been worked up as an outpatient.  Patient reports she has had on and off abdominal pain, nausea, vomiting for several months but has been unclear if that was due to her uterine bleeding versus medication overuse.  Patient had an ultrasound yesterday that showed a lobular appearing uterus with cavitary lesion and a small amount of fluid noted around uterine cavity.  Patient's outpatient team has recommended hysterectomy.  - Continue following with outpatient providers        Medicine will continue to follow         Juan Davis PA-C  Internal Medicine DELTA Hospitalist             Disposition Plan   Reason for ongoing admission: requires detoxification from substance that poses a risk of bodily harm during withdrawal period  Discharge location: home with family, plan is Sunday at 1pm  will pick her up if stable/ready for discharge   Discharge Medications: ordered. Including phenobarbital taper, pt should get 2 doses on unit on 1/19 prior to discharge and then receive 3 days of taper from discharge pharmacy and additional 4 days sent to pts home pharmacy for her to start on 1/22  Follow-up Appointments: Pt should follow up with PCP for ongoing management of migraines and recommend pt pursue another referral to Neurology to revisit treatment options   Legal Status: voluntary    >50 min total time that was spent in counseling and coordination of care with staff, reviewing medical record, educating patient about treatment options, side effects and benefits and alternative  treatments for medications, providing supportive therapy and redirection regarding above symptoms.     This document is created with the help of Dragon dictation system.  All grammatical/typing errors or context distortion are unintentional and inherent to software.    Patient has been seen and evaluated by Leola shelton DO.

## 2025-01-18 PROCEDURE — 250N000011 HC RX IP 250 OP 636: Performed by: PSYCHIATRY & NEUROLOGY

## 2025-01-18 PROCEDURE — 250N000013 HC RX MED GY IP 250 OP 250 PS 637: Performed by: PSYCHIATRY & NEUROLOGY

## 2025-01-18 PROCEDURE — 128N000004 HC R&B CD ADULT

## 2025-01-18 PROCEDURE — 250N000013 HC RX MED GY IP 250 OP 250 PS 637: Performed by: STUDENT IN AN ORGANIZED HEALTH CARE EDUCATION/TRAINING PROGRAM

## 2025-01-18 RX ORDER — PHENOBARBITAL 64.8 MG/1
64.8 TABLET ORAL ONCE
Status: COMPLETED | OUTPATIENT
Start: 2025-01-19 | End: 2025-01-19

## 2025-01-18 RX ORDER — PHENOBARBITAL 32.4 MG/1
32.4 TABLET ORAL DAILY
Status: DISCONTINUED | OUTPATIENT
Start: 2025-01-19 | End: 2025-01-19 | Stop reason: HOSPADM

## 2025-01-18 RX ADMIN — CETIRIZINE HYDROCHLORIDE 10 MG: 5 TABLET ORAL at 07:48

## 2025-01-18 RX ADMIN — IBUPROFEN 800 MG: 400 TABLET, FILM COATED ORAL at 11:27

## 2025-01-18 RX ADMIN — CETIRIZINE HYDROCHLORIDE 10 MG: 5 TABLET ORAL at 19:52

## 2025-01-18 RX ADMIN — SUMATRIPTAN SUCCINATE 100 MG: 50 TABLET ORAL at 05:14

## 2025-01-18 RX ADMIN — IBUPROFEN 800 MG: 400 TABLET, FILM COATED ORAL at 21:58

## 2025-01-18 RX ADMIN — HYDROXYZINE HYDROCHLORIDE 25 MG: 25 TABLET, FILM COATED ORAL at 05:14

## 2025-01-18 RX ADMIN — CLONIDINE HYDROCHLORIDE 0.1 MG: 0.1 TABLET ORAL at 06:29

## 2025-01-18 RX ADMIN — ONDANSETRON 4 MG: 4 TABLET, ORALLY DISINTEGRATING ORAL at 16:20

## 2025-01-18 RX ADMIN — NICOTINE POLACRILEX 2 MG: 2 GUM, CHEWING BUCCAL at 14:18

## 2025-01-18 RX ADMIN — HYDROXYZINE HYDROCHLORIDE 25 MG: 25 TABLET, FILM COATED ORAL at 18:26

## 2025-01-18 RX ADMIN — Medication 200 MG: at 07:48

## 2025-01-18 RX ADMIN — RABEPRAZOLE SODIUM 20 MG: 20 TABLET, DELAYED RELEASE ORAL at 16:12

## 2025-01-18 RX ADMIN — CLONIDINE HYDROCHLORIDE 0.1 MG: 0.1 TABLET ORAL at 19:54

## 2025-01-18 RX ADMIN — TIZANIDINE 2 MG: 2 TABLET ORAL at 21:58

## 2025-01-18 RX ADMIN — NICOTINE POLACRILEX 2 MG: 2 GUM, CHEWING BUCCAL at 17:45

## 2025-01-18 RX ADMIN — PHENOBARBITAL 64.8 MG: 64.8 TABLET ORAL at 07:48

## 2025-01-18 RX ADMIN — NICOTINE POLACRILEX 2 MG: 2 GUM, CHEWING BUCCAL at 12:21

## 2025-01-18 RX ADMIN — PHENOBARBITAL 64.8 MG: 64.8 TABLET ORAL at 19:52

## 2025-01-18 RX ADMIN — TRAZODONE HYDROCHLORIDE 50 MG: 50 TABLET ORAL at 21:58

## 2025-01-18 RX ADMIN — Medication 400 MG: at 07:48

## 2025-01-18 RX ADMIN — ACETAMINOPHINE, CAFFEINE 2 TABLET: 500; 65 TABLET, FILM COATED ORAL at 14:14

## 2025-01-18 RX ADMIN — ONDANSETRON 4 MG: 4 TABLET, ORALLY DISINTEGRATING ORAL at 06:29

## 2025-01-18 RX ADMIN — NICOTINE 1 PATCH: 21 PATCH, EXTENDED RELEASE TRANSDERMAL at 07:48

## 2025-01-18 RX ADMIN — SUMATRIPTAN SUCCINATE 100 MG: 50 TABLET ORAL at 16:20

## 2025-01-18 RX ADMIN — AMITRIPTYLINE HYDROCHLORIDE 10 MG: 10 TABLET, FILM COATED ORAL at 21:58

## 2025-01-18 RX ADMIN — DOCOSANOL: 100 CREAM TOPICAL at 14:12

## 2025-01-18 RX ADMIN — PHENOBARBITAL 64.8 MG: 64.8 TABLET ORAL at 16:12

## 2025-01-18 RX ADMIN — PHENOBARBITAL 64.8 MG: 64.8 TABLET ORAL at 11:27

## 2025-01-18 RX ADMIN — HYDROXYZINE HYDROCHLORIDE 25 MG: 25 TABLET, FILM COATED ORAL at 14:18

## 2025-01-18 RX ADMIN — RABEPRAZOLE SODIUM 20 MG: 20 TABLET, DELAYED RELEASE ORAL at 07:53

## 2025-01-18 RX ADMIN — NICOTINE POLACRILEX 2 MG: 2 GUM, CHEWING BUCCAL at 07:53

## 2025-01-18 RX ADMIN — NICOTINE POLACRILEX 2 MG: 2 GUM, CHEWING BUCCAL at 16:23

## 2025-01-18 ASSESSMENT — ACTIVITIES OF DAILY LIVING (ADL)
ADLS_ACUITY_SCORE: 22
ADLS_ACUITY_SCORE: 22
HYGIENE/GROOMING: INDEPENDENT
ADLS_ACUITY_SCORE: 22

## 2025-01-18 NOTE — PLAN OF CARE
Problem: Excessive Substance Use  Goal: Optimized Energy Level (Excessive Substance Use)  Outcome: Progressing   Goal Outcome Evaluation:    Plan of Care Reviewed With: patient      Patient alert and oriented x 4. She presented with bright affect. She reported that she slept better last night. She denied pain and all mental health symptoms. She said the PRNs she received prior to the writer's shift were effective. Patient's eating and drinking well. She's visible in the lounge. Spent most of her time watching TV.    PRN given:    Ibuprofen for headache 2/10  Nicotine gum  Excedrin  Abreva  Hydroxyzine    All PRNs were effective per patient.

## 2025-01-18 NOTE — PLAN OF CARE
Problem: Pain Chronic (Persistent)  Goal: Optimal Pain Control and Function  Outcome: Progressing  Intervention: Develop Pain Management Plan  Recent Flowsheet Documentation  Taken 1/18/2025 0514 by Demarcus Barajas RN  Pain Management Interventions:   medication (see MAR)   cold applied     Problem: Substance Withdrawal  Goal: Substance Withdrawal  Description: Signs and symptoms of listed problems will be absent or manageable.  Outcome: Progressing   Goal Outcome Evaluation:    MSSA scores of 5/4, patient continues to complain of headache pain and receives hydroxyzine 25mg and Imitrex 100mg @0514 and Zofran 4mg and clonidine 0.1mg for mild nausea and anxiety respectivly@ 0629. Patient also complains of some left side tooth pain where she recently had a crown placed. Patient sleeps throughout the noc arising early as is her habit.

## 2025-01-18 NOTE — PLAN OF CARE
Goal Outcome Evaluation:    Plan of Care Reviewed With: patient        Pt visible in the milieu, socialized with peers, watched TV and made and received phone calls. Pt endorsed moderate anxiety and received prn hydroxyzine with effectiveness. Pt denied all other psych mental health symptoms and committed for safety. Pt scored 4 and 5 on MSSA and on scheduled phenobarbital for fioricet withdrawal.   Adequate fluids and food intake, voiding freely and no BM concern per pt.  Received prn zofran for nausea with effectiveness. Pt also received prn Excedrin for migraine headache with some relieved. Pt denied pain and shortness of breath and vital sign stable.   /87 (BP Location: Left arm, Patient Position: Sitting, Cuff Size: Adult Large)   Pulse 86   Temp 98.6  F (37  C) (Oral)   Resp 16   SpO2 99%

## 2025-01-18 NOTE — PLAN OF CARE
Group Attendance:  attended full group     Time session began: 1645  Time session ended:1717  Patient's total time in group: 32     Total # Attendees    3   Group Type psychotherapeutic      Group Topic Covered symptom management      Group Session Detail Group focused on different coping skills with different diagnoses and symptoms. Patients shared their insights regarding their diagnoses and the reasons for their hospitalization. Following this, each patient identified one tool that the patient could utilize to seek stabilization when faced with an emotional or mental health crisis. Positive coping techniques were emphasized with the facilitator providing redirections where needed.      Patient's response to the group topic/interactions:  positive affect and cooperative with task      Patient Details: Attended and participated               39125 - Group psychotherapy - 1 Session        Patient Active Problem List   Diagnosis    Smoker    Cervical high risk HPV (human papillomavirus) test positive    Barbiturate dependence (H)

## 2025-01-19 VITALS
SYSTOLIC BLOOD PRESSURE: 138 MMHG | TEMPERATURE: 98.5 F | HEART RATE: 88 BPM | OXYGEN SATURATION: 100 % | DIASTOLIC BLOOD PRESSURE: 91 MMHG | RESPIRATION RATE: 16 BRPM

## 2025-01-19 PROCEDURE — 250N000011 HC RX IP 250 OP 636: Performed by: PSYCHIATRY & NEUROLOGY

## 2025-01-19 PROCEDURE — 250N000013 HC RX MED GY IP 250 OP 250 PS 637: Performed by: STUDENT IN AN ORGANIZED HEALTH CARE EDUCATION/TRAINING PROGRAM

## 2025-01-19 PROCEDURE — 99239 HOSP IP/OBS DSCHRG MGMT >30: CPT | Performed by: REGISTERED NURSE

## 2025-01-19 PROCEDURE — 250N000013 HC RX MED GY IP 250 OP 250 PS 637: Performed by: PSYCHIATRY & NEUROLOGY

## 2025-01-19 RX ORDER — CLONIDINE HYDROCHLORIDE 0.1 MG/1
0.1 TABLET ORAL 3 TIMES DAILY PRN
Qty: 90 TABLET | Refills: 0 | Status: SHIPPED | OUTPATIENT
Start: 2025-01-19

## 2025-01-19 RX ADMIN — RABEPRAZOLE SODIUM 20 MG: 20 TABLET, DELAYED RELEASE ORAL at 07:37

## 2025-01-19 RX ADMIN — SUMATRIPTAN SUCCINATE 100 MG: 50 TABLET ORAL at 05:28

## 2025-01-19 RX ADMIN — HYDROXYZINE HYDROCHLORIDE 25 MG: 25 TABLET, FILM COATED ORAL at 05:28

## 2025-01-19 RX ADMIN — NICOTINE POLACRILEX 2 MG: 2 GUM, CHEWING BUCCAL at 11:00

## 2025-01-19 RX ADMIN — ONDANSETRON 4 MG: 4 TABLET, ORALLY DISINTEGRATING ORAL at 12:38

## 2025-01-19 RX ADMIN — CLONIDINE HYDROCHLORIDE 0.1 MG: 0.1 TABLET ORAL at 06:06

## 2025-01-19 RX ADMIN — ERGOCALCIFEROL 50000 UNITS: 1.25 CAPSULE, LIQUID FILLED ORAL at 07:37

## 2025-01-19 RX ADMIN — NICOTINE POLACRILEX 2 MG: 2 GUM, CHEWING BUCCAL at 07:42

## 2025-01-19 RX ADMIN — PHENOBARBITAL 64.8 MG: 64.8 TABLET ORAL at 11:50

## 2025-01-19 RX ADMIN — Medication 200 MG: at 07:37

## 2025-01-19 RX ADMIN — ONDANSETRON 4 MG: 4 TABLET, ORALLY DISINTEGRATING ORAL at 05:28

## 2025-01-19 RX ADMIN — Medication 400 MG: at 07:37

## 2025-01-19 RX ADMIN — CLONIDINE HYDROCHLORIDE 0.1 MG: 0.1 TABLET ORAL at 11:00

## 2025-01-19 RX ADMIN — HYDROXYZINE HYDROCHLORIDE 25 MG: 25 TABLET, FILM COATED ORAL at 11:00

## 2025-01-19 RX ADMIN — PHENOBARBITAL 32.4 MG: 32.4 TABLET ORAL at 07:37

## 2025-01-19 RX ADMIN — CETIRIZINE HYDROCHLORIDE 10 MG: 5 TABLET ORAL at 07:37

## 2025-01-19 RX ADMIN — NICOTINE 1 PATCH: 21 PATCH, EXTENDED RELEASE TRANSDERMAL at 07:39

## 2025-01-19 RX ADMIN — ACETAMINOPHINE, CAFFEINE 2 TABLET: 500; 65 TABLET, FILM COATED ORAL at 07:42

## 2025-01-19 ASSESSMENT — ACTIVITIES OF DAILY LIVING (ADL)
ADLS_ACUITY_SCORE: 22

## 2025-01-19 NOTE — PLAN OF CARE
Problem: Pain Chronic (Persistent)  Goal: Optimal Pain Control and Function  Intervention: Develop Pain Management Plan  Recent Flowsheet Documentation  Taken 1/19/2025 0807 by Christal Watson RN  Pain Management Interventions: medication (see MAR)   Goal Outcome Evaluation:    Plan of Care Reviewed With: patient      Patient alert and oriented x 4. She presented with bright affect. She c/o headache 2/10 PRN excedrin given- reported. She reported low level of anxiety but expressed excitement about her discharge. She denied SI/HI/AVH. She's eating and drinking well. She's social with staff and other patients.     PRNs given:    Clonidine - anxiety  Nicotine gum  Hydroxyzine- anxiety  Excedrin- headache  Zofran - nausea    The above medications were effective per patient.    MSSA 3, 3    Discharge order in. Medications discussed with patient. Patient verbalized understanding specifically for the phenobarbital medication. Patient was prescribed doses until 01/21 and will  the other half of the Rx on 01/22/25 from her pharmacy. AVS discussed and given to patient. She denied SI/HI/AVH. Belongings given. Patient went home in fair condition accompanied by PA.

## 2025-01-19 NOTE — PLAN OF CARE
Problem: Pain Chronic (Persistent)  Goal: Optimal Pain Control and Function  Outcome: Progressing  Intervention: Develop Pain Management Plan  Recent Flowsheet Documentation  Taken 1/19/2025 0613 by Demarcus Barajas RN  Pain Management Interventions: medication (see MAR)     Problem: Substance Withdrawal  Goal: Substance Withdrawal  Description: Signs and symptoms of listed problems will be absent or manageable.  Outcome: Progressing   Goal Outcome Evaluation:    MSSA score of 4, patient complains of mild headache pain, nausea and anxiety  on arising and receives hydroxyzine 25mg, Zofran 4mg and Imitrex 100mg @0528 and and clonidine 0.1mg @ 0629.  Patient sleeps throughout the noc arising early as is her habit.

## 2025-01-19 NOTE — PLAN OF CARE
"Problem: Substance Withdrawal  Goal: Substance Withdrawal    Outcome: Progressing  Flowsheets (Taken 1/18/2025 2135)  Substance Withdrawal Assessed: all     Goal Outcome Evaluation:    Assumed care of patient at 1900 watching [facility]. Patient is A&Ox5. Patient is tolerating oral intake. Denies N/V/D, hallucinations, anxiety, SI, HI, and all withdrawal symptoms. Patient states, \"I feel pretty good.\" Patient stated she feels ready for discharge tomorrow. Patient's MSSA score resulted as 6, no medications given.  Patient experienced no safety events or escalations during the shift, no concerns reported or observed. Safety checks completed at least every 15 minutes. Patient required PRN Clonidine 0.1 mg for elevated heart rate of 110, see MAR. Patient heart rate came down to 96. Nursing will continue to monitor.    "
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
## 2025-01-19 NOTE — PLAN OF CARE
"  Problem: Substance Withdrawal  Goal: Substance Withdrawal  Description: Signs and symptoms of listed problems will be absent or manageable.  Outcome: Progressing     Problem: Adult Behavioral Health Plan of Care  Goal: Adheres to Safety Considerations for Self and Others  Outcome: Progressing   Goal Outcome Evaluation:    Plan of Care Reviewed With: patient        Care of patient from [id number].   Patient has been calm and cooperative, frequent requests for PRNs. Patient appears anxious about her scheduled discharge tomorrow, she reported she had her day shift nurse check for a note confirming that she will be discharged tomorrow at 1pm and wanted writer to also check. Patient reports she is looking forward to going home and has a safe, clear path on how to manage her migraines. Patient denies depression, SI/HI. Endorsed anxiety, requested and received PRN hydroxyzine. Patient reports this anxiety is due to being here and being \"out of my element\".            "

## 2025-01-21 ENCOUNTER — PATIENT OUTREACH (OUTPATIENT)
Dept: CARE COORDINATION | Facility: CLINIC | Age: 45
End: 2025-01-21
Payer: COMMERCIAL

## 2025-01-22 NOTE — PROGRESS NOTES
[facility] Care Resource Center Contact  [facility]/Voicemail     Clinical Data: Post-Discharge Outreach     Outreach attempted x 2.  Left message on patient's voicemail, providing [facility] [place]'s central phone number of [phone] ([phone]) for questions/concerns and/or to schedule an appt with an [facility] provider, if they do not have a PCP.     Patient stated she would call writer back but did not receive a call.     Plan:  [facility] will do no further outreaches at this time.       Ирина Mckinnon, MARGARET  Connected Care Resource Center, [facility]    *Connected Care Resource Team does NOT follow patient ongoing. Referrals are identified based on internal discharge reports and the outreach is to ensure patient has an understanding of their discharge instructions.

## 2025-04-15 ENCOUNTER — LAB REQUISITION (OUTPATIENT)
Dept: LAB | Facility: CLINIC | Age: 45
End: 2025-04-15
Payer: COMMERCIAL

## 2025-04-15 DIAGNOSIS — J32.8 OTHER CHRONIC SINUSITIS: ICD-10-CM

## 2025-04-16 LAB
BACTERIA SPEC CULT: ABNORMAL
GRAM STAIN RESULT: ABNORMAL

## 2025-04-17 LAB
BACTERIA SPEC CULT: ABNORMAL

## 2025-04-19 LAB
BACTERIA SPEC CULT: ABNORMAL
BACTERIA SPEC CULT: ABNORMAL

## 2025-05-05 ENCOUNTER — APPOINTMENT (OUTPATIENT)
Dept: ULTRASOUND IMAGING | Facility: HOSPITAL | Age: 45
End: 2025-05-05
Attending: EMERGENCY MEDICINE
Payer: COMMERCIAL

## 2025-05-05 ENCOUNTER — HOSPITAL ENCOUNTER (EMERGENCY)
Facility: HOSPITAL | Age: 45
Discharge: HOME | End: 2025-05-05
Attending: EMERGENCY MEDICINE | Admitting: EMERGENCY MEDICINE
Payer: COMMERCIAL

## 2025-05-05 VITALS
HEART RATE: 108 BPM | OXYGEN SATURATION: 98 % | BODY MASS INDEX: 32.2 KG/M2 | RESPIRATION RATE: 20 BRPM | SYSTOLIC BLOOD PRESSURE: 102 MMHG | HEIGHT: 62 IN | WEIGHT: 175 LBS | DIASTOLIC BLOOD PRESSURE: 60 MMHG | TEMPERATURE: 99.6 F

## 2025-05-05 DIAGNOSIS — N80.9 ENDOMETRIOSIS: ICD-10-CM

## 2025-05-05 LAB
ALBUMIN UR-MCNC: NEGATIVE MG/DL
ANION GAP SERPL CALCULATED.3IONS-SCNC: 12 MMOL/L (ref 7–15)
APPEARANCE UR: CLEAR
BILIRUB UR QL STRIP: NEGATIVE
BUN SERPL-MCNC: 10.1 MG/DL (ref 6–20)
CALCIUM SERPL-MCNC: 9.6 MG/DL (ref 8.8–10.4)
CHLORIDE SERPL-SCNC: 102 MMOL/L (ref 98–107)
COLOR UR AUTO: ABNORMAL
CREAT SERPL-MCNC: 0.75 MG/DL (ref 0.51–0.95)
EGFRCR SERPLBLD CKD-EPI 2021: >90 ML/MIN/1.73M2
ERYTHROCYTE [DISTWIDTH] IN BLOOD BY AUTOMATED COUNT: 12.9 % (ref 10–15)
GLUCOSE SERPL-MCNC: 98 MG/DL (ref 70–99)
GLUCOSE UR STRIP-MCNC: NEGATIVE MG/DL
HCG UR QL: NEGATIVE
HCO3 SERPL-SCNC: 23 MMOL/L (ref 22–29)
HCT VFR BLD AUTO: 36 % (ref 35–47)
HGB BLD-MCNC: 12.3 G/DL (ref 11.7–15.7)
HGB UR QL STRIP: NEGATIVE
HOLD SPECIMEN: NORMAL
HOLD SPECIMEN: NORMAL
KETONES UR STRIP-MCNC: ABNORMAL MG/DL
LEUKOCYTE ESTERASE UR QL STRIP: NEGATIVE
MCH RBC QN AUTO: 30.1 PG (ref 26.5–33)
MCHC RBC AUTO-ENTMCNC: 34.2 G/DL (ref 31.5–36.5)
MCV RBC AUTO: 88 FL (ref 78–100)
NITRATE UR QL: NEGATIVE
PH UR STRIP: 7 [PH] (ref 5–7)
PLATELET # BLD AUTO: 314 10E3/UL (ref 150–450)
POTASSIUM SERPL-SCNC: 3.7 MMOL/L (ref 3.4–5.3)
RBC # BLD AUTO: 4.09 10E6/UL (ref 3.8–5.2)
RBC URINE: 1 /HPF
SODIUM SERPL-SCNC: 137 MMOL/L (ref 135–145)
SP GR UR STRIP: 1.02 (ref 1–1.03)
SQUAMOUS EPITHELIAL: 1 /HPF
UROBILINOGEN UR STRIP-MCNC: NORMAL MG/DL
WBC # BLD AUTO: 16.9 10E3/UL (ref 4–11)
WBC URINE: 1 /HPF

## 2025-05-05 PROCEDURE — 81025 URINE PREGNANCY TEST: CPT | Performed by: EMERGENCY MEDICINE

## 2025-05-05 PROCEDURE — 76856 US EXAM PELVIC COMPLETE: CPT

## 2025-05-05 PROCEDURE — 81003 URINALYSIS AUTO W/O SCOPE: CPT | Performed by: EMERGENCY MEDICINE

## 2025-05-05 PROCEDURE — 99285 EMERGENCY DEPT VISIT HI MDM: CPT | Mod: 25

## 2025-05-05 PROCEDURE — 85014 HEMATOCRIT: CPT | Performed by: EMERGENCY MEDICINE

## 2025-05-05 PROCEDURE — 80048 BASIC METABOLIC PNL TOTAL CA: CPT | Performed by: EMERGENCY MEDICINE

## 2025-05-05 PROCEDURE — 250N000011 HC RX IP 250 OP 636: Mod: JZ | Performed by: EMERGENCY MEDICINE

## 2025-05-05 PROCEDURE — 36415 COLL VENOUS BLD VENIPUNCTURE: CPT | Performed by: EMERGENCY MEDICINE

## 2025-05-05 PROCEDURE — 96374 THER/PROPH/DIAG INJ IV PUSH: CPT

## 2025-05-05 PROCEDURE — 96376 TX/PRO/DX INJ SAME DRUG ADON: CPT

## 2025-05-05 PROCEDURE — 85027 COMPLETE CBC AUTOMATED: CPT | Performed by: EMERGENCY MEDICINE

## 2025-05-05 PROCEDURE — 96375 TX/PRO/DX INJ NEW DRUG ADDON: CPT

## 2025-05-05 PROCEDURE — 93976 VASCULAR STUDY: CPT

## 2025-05-05 RX ORDER — KETOROLAC TROMETHAMINE 15 MG/ML
15 INJECTION, SOLUTION INTRAMUSCULAR; INTRAVENOUS ONCE
Status: COMPLETED | OUTPATIENT
Start: 2025-05-05 | End: 2025-05-05

## 2025-05-05 RX ORDER — ONDANSETRON 2 MG/ML
4 INJECTION INTRAMUSCULAR; INTRAVENOUS ONCE
Status: COMPLETED | OUTPATIENT
Start: 2025-05-05 | End: 2025-05-05

## 2025-05-05 RX ORDER — MORPHINE SULFATE 4 MG/ML
4 INJECTION, SOLUTION INTRAMUSCULAR; INTRAVENOUS EVERY 30 MIN PRN
Status: DISCONTINUED | OUTPATIENT
Start: 2025-05-05 | End: 2025-05-05 | Stop reason: HOSPADM

## 2025-05-05 RX ORDER — OXYCODONE HYDROCHLORIDE 5 MG/1
5 TABLET ORAL EVERY 6 HOURS PRN
Qty: 12 TABLET | Refills: 0 | Status: SHIPPED | OUTPATIENT
Start: 2025-05-05 | End: 2025-05-08

## 2025-05-05 RX ADMIN — MORPHINE SULFATE 4 MG: 4 INJECTION, SOLUTION INTRAMUSCULAR; INTRAVENOUS at 19:05

## 2025-05-05 RX ADMIN — ONDANSETRON 4 MG: 2 INJECTION, SOLUTION INTRAMUSCULAR; INTRAVENOUS at 16:04

## 2025-05-05 RX ADMIN — MORPHINE SULFATE 4 MG: 4 INJECTION, SOLUTION INTRAMUSCULAR; INTRAVENOUS at 16:35

## 2025-05-05 RX ADMIN — KETOROLAC TROMETHAMINE 15 MG: 15 INJECTION, SOLUTION INTRAMUSCULAR; INTRAVENOUS at 16:04

## 2025-05-05 ASSESSMENT — ACTIVITIES OF DAILY LIVING (ADL)
ADLS_ACUITY_SCORE: 45

## 2025-05-05 ASSESSMENT — COLUMBIA-SUICIDE SEVERITY RATING SCALE - C-SSRS
1. IN THE PAST MONTH, HAVE YOU WISHED YOU WERE DEAD OR WISHED YOU COULD GO TO SLEEP AND NOT WAKE UP?: NO
2. HAVE YOU ACTUALLY HAD ANY THOUGHTS OF KILLING YOURSELF IN THE PAST MONTH?: NO
6. HAVE YOU EVER DONE ANYTHING, STARTED TO DO ANYTHING, OR PREPARED TO DO ANYTHING TO END YOUR LIFE?: NO

## 2025-05-05 NOTE — ED TRIAGE NOTES
W/c to triage.  Pt states hx of endomiosis and monthly has cramping and n/v but today not able to tolerate.  Hx of ablation.  Yesterday 1700 started carmping pain in lower abd.  Took midol 0930 without relief.      Triage Assessment (Adult)       Row Name 05/05/25 1439          Triage Assessment    Airway WDL WDL        Respiratory WDL    Respiratory WDL WDL        Skin Circulation/Temperature WDL    Skin Circulation/Temperature WDL WDL        Cardiac WDL    Cardiac WDL WDL        Peripheral/Neurovascular WDL    Peripheral Neurovascular WDL WDL        Cognitive/Neuro/Behavioral WDL    Cognitive/Neuro/Behavioral WDL WDL

## 2025-05-05 NOTE — DISCHARGE INSTRUCTIONS
Ice or heat off-and-on to sore areas may help with pain.  Over-the-counter Tylenol or ibuprofen as tolerated every 6 hours to help with pain.  1 oxycodone every 6 hours can be taken for pain.  Do not drive with this.  Have someone drive you home today.  Follow-up with your GYN doctor tomorrow.

## 2025-05-05 NOTE — ED PROVIDER NOTES
EMERGENCY DEPARTMENT ENCOUNTER      NAME: Jennifer A Schwab  AGE: 44 year old female  YOB: 1980  MRN: [id number]  EVALUATION DATE & TIME: 5/5/2025  3:20 PM    PCP: Jairo Colindres    ED PROVIDER: Harpreet Castro M.D.      Chief Complaint   Patient presents with    Abdominal Pain         FINAL IMPRESSION:  1.  Severe abdominal pain.  2.  Chronic endometriosis with exacerbation      ED COURSE & MEDICAL DECISION MAKING:    3:48 PM.  I met with the patient to gather history and to perform my initial exam. We discussed plans for the ED course, including diagnostic testing and treatment. PPE worn: cloth mask.  Patient with diffuse abdominal pain with severe monthly.  Beginning with associated nausea and vomiting.  Symptoms beginning yesterday.  Severe above and beyond normal severity.  6:24 PM.  Patient normal profile unremarkable.  Urinalysis trace ketones but otherwise negative.  CBC with normal hemoglobin and white count mildly elevated 16.5.  This is probably secondary to pain and inflammation.  Ultrasound showing endometrium at upper limit of normal at 15 mm.  No focal abnormalities.  1 small uterine fibroid is noted.  Otherwise no acute findings.  Patient feeling much better after medications and will be able to go home.  Patient notes medication reaction to Percocet which caused nausea and vomiting.  However she notes she is taken plain oxycodone without difficulty.      Pertinent Labs & Imaging studies reviewed. (See chart for details)  44 year old female presents to the Emergency Department for evaluation of severe abdominal pain.    At the conclusion of the encounter I discussed the results of all of the tests and the disposition. The questions were answered. The patient or family acknowledged understanding and was agreeable with the care plan.              Medical Decision Making  Computer inpatient and outpatient history was reviewed.  Patient history reviewed.    Evaluation pending.  Anticipate  Infant's vitals remain stable. Infant received and remains on room air. Infant maintaining appropriate sats, no increase work of breathing noted. Infant received and remains on Q3 hour PO/NG feeds. Attempted PO feed x1 this shift.  Infant tolerating feeds, discharge home on pain medicines.    MIPS (CTPE, Dental pain, [place], Sinusitis, Asthma/COPD, Head Trauma): Not Applicable    SEPSIS: None          MEDICATIONS GIVEN IN THE EMERGENCY:  Medications   ketorolac (TORADOL) injection 15 mg (has no administration in time range)   ondansetron (ZOFRAN) injection 4 mg (has no administration in time range)   morphine (PF) injection 4 mg (has no administration in time range)       NEW PRESCRIPTIONS STARTED AT TODAY'S ER VISIT  New Prescriptions    No medications on file          =================================================================    HPI    Patient information was obtained from: The patient.    Use of : N/A         Jennifer A Schwab is a 44 year old female with a pertinent history of adenomyosis and endometriosis who presents to this ED today for evaluation of severe abdominal pain.  Patient with longstanding history of endometriosis complicated by adenomyosis with monthly cramping.  Today however is very severe and intolerable.    She does not identify any waxing or waning symptoms otherwise, exacerbating or alleviating features, associated symptoms except as mentioned.  She otherwise denies any pain related complaints.    REVIEW OF SYSTEMS   Review of Systems patient complaining of abdominal pain and back pain.  No other complaints at this time.    PAST MEDICAL HISTORY:  Past Medical History:   Diagnosis Date    Cervical high risk HPV (human papillomavirus) test positive 11/17/2016    GERD (gastroesophageal reflux disease)     History of anesthesia complications     Intussusception (H)     Intussusception intestine (H) 2013    PONV (postoperative nausea and vomiting)     Preeclampsia in postpartum period 2010    1 week after 2nd delivery       PAST SURGICAL HISTORY:  Past Surgical History:   Procedure Laterality Date    ABDOMEN SURGERY      INTUSSUSCEPTION REPAIR      TN HYSTEROSCOPY,W/ENDO BX N/A 10/5/2018    Procedure: HYSTEROSCOPY, WITH DILATION AND  CURETTAGE;  Surgeon: Dallas Elizabeth MD;  Location: [facility] OR;  Service: Gynecology    TN HYSTEROSCOPY,W/ENDO BX N/A 12/9/2020    Procedure: HYSTEROSCOPY, WITH DILATION AND CURETTAGE, NOVASURE ABLATION;  Surgeon: Ortega Jackson MD;  Location: [facility] OR;  Service: Gynecology    TN LAP,TUBAL CAUTERY N/A 10/5/2018    Procedure: LAPAROSCOPIC TUBAL LIGATION, HYSTEROSCOPY, WITH DILATION AND CURETTAGE;  Surgeon: Dalals Elizabeth MD;  Location: [facility];  Service: Gynecology    SURGICAL HISTORY OF -   2013    intussusception    WISDOM TOOTH EXTRACTION             CURRENT MEDICATIONS:    amitriptyline (ELAVIL) 10 MG tablet  cetirizine (ZYRTEC) 10 MG tablet  cloNIDine (CATAPRES) 0.1 MG tablet  docosanol (ABREVA) 10 % CREA cream  hydrOXYzine HCl (ATARAX) 25 MG tablet  ibuprofen (ADVIL/MOTRIN) 800 MG tablet  magnesium oxide (MAG-OX) 400 MG tablet  ondansetron (ZOFRAN ODT) 4 MG ODT tab  PHENobarbital 32.4 MG tablet  RABEprazole (ACIPHEX) 20 MG EC tablet  Riboflavin (VITAMIN B-2) 400 MG TABS  SUMAtriptan (IMITREX) 100 MG tablet  tiZANidine (ZANAFLEX) 2 MG capsule  vitamin D2 (ERGOCALCIFEROL) 21730 units (1250 mcg) capsule        ALLERGIES:  Allergies   Allergen Reactions    Paper Tape [Adhesive Tape] Hives       FAMILY HISTORY:  Family History   Problem Relation Age of Onset    Down Syndrome Other         husbands 1/2 brother    Gastrointestinal Disease Daughter         severe constipation    Unknown/Adopted Father         unknown family history    Breast Cancer Maternal Grandmother     Diabetes Mother         type II before gastric bypass    Other Cancer Mother        SOCIAL HISTORY:   Social History     Socioeconomic History    Marital status:     Number of children: 2   Tobacco Use    Smoking status: Smoker, Current Status Unknown     Current packs/day: 0.25     Types: Cigarettes    Smokeless tobacco: Never    Tobacco comments:     smoking 20cig/day- down to 5 cig/day with pregnancy  "  Substance and Sexual Activity    Alcohol use: No     Alcohol/week: 0.0 standard drinks of alcohol     Comment: rare- quit with pregnancy    Drug use: No    Sexual activity: Yes   Social History Narrative    ** Merged History Encounter **   09/12/2016 - Patient is recently .     Social Drivers of Health     Financial Resource Strain: Low Risk  (1/16/2025)    Financial Resource Strain     Within the past 12 months, have you or your family members you live with been unable to get utilities (heat, electricity) when it was really needed?: No   Food Insecurity: Low Risk  (1/16/2025)    Food Insecurity     Within the past 12 months, did you worry that your food would run out before you got money to buy more?: No     Within the past 12 months, did the food you bought just not last and you didn t have money to get more?: No   Transportation Needs: Low Risk  (1/16/2025)    Transportation Needs     Within the past 12 months, has lack of transportation kept you from medical appointments, getting your medicines, non-medical meetings or appointments, work, or from getting things that you need?: No   Interpersonal Safety: Low Risk  (1/16/2025)    Interpersonal Safety     Do you feel physically and emotionally safe where you currently live?: Yes     Within the past 12 months, have you been hit, slapped, kicked or otherwise physically hurt by someone?: No     Within the past 12 months, have you been humiliated or emotionally abused in other ways by your partner or ex-partner?: No   Housing Stability: Low Risk  (1/16/2025)    Housing Stability     Do you have housing? : Yes     Are you worried about losing your housing?: No     Occasional smoking.  No alcohol.  History of phenobarbital dependence in the past.      VITALS:  /82   Pulse 101   Temp 99.6  F (37.6  C) (Oral)   Resp 20   Ht 1.575 m (5' 2\")   Wt 79.4 kg (175 lb)   SpO2 98%   BMI 32.01 kg/m      PHYSICAL EXAM    Vital Signs:  /82   Pulse 101   " "Temp 99.6  F (37.6  C) (Oral)   Resp 20   Ht 1.575 m (5' 2\")   Wt 79.4 kg (175 lb)   SpO2 98%   BMI 32.01 kg/m    General:  On entering the room she appears to be in significant pain and discomfort.  Neck:  Neck supple with full range of motion and nontender.    Back:  Back and spine are nontender.  Bilateral.  Costovertebral angle tenderness.    HEENT:  Oropharynx clear with moist mucous membranes.  HEENT unremarkable.    Pulmonary:  Chest clear to auscultation without rhonchi rales or wheezing.    Cardiovascular:  Cardiac regular rate and rhythm without murmurs rubs or gallops.    Abdomen:  Abdomen soft and diffuse abdominal pain everywhere.  There is no rebound or guarding.    Muskuloskeletal:  She moves all 4 without any difficulty and has normal neurovascular exams.  Extremities without clubbing, cyanosis, or edema.  Legs and calves are nontender.    Neuro:  She is alert and oriented ×3 and moves all extremities symmetrically.    Psych:  Normal affect.    Skin:  Unremarkable and warm and dry.       LAB:  All pertinent labs reviewed and interpreted.  Labs Ordered and Resulted from Time of ED Arrival to Time of ED Departure   CBC WITH PLATELETS - Abnormal       Result Value    WBC Count 16.9 (*)     RBC Count 4.09      Hemoglobin 12.3      Hematocrit 36.0      MCV 88      MCH 30.1      MCHC 34.2      RDW 12.9      Platelet Count 314     ROUTINE UA WITH MICROSCOPIC REFLEX TO CULTURE - Abnormal    Color Urine Light Yellow      Appearance Urine Clear      Glucose Urine Negative      Bilirubin Urine Negative      Ketones Urine Trace (*)     Specific Gravity Urine 1.023      Blood Urine Negative      pH Urine 7.0      Protein Albumin Urine Negative      Urobilinogen Urine Normal      Nitrite Urine Negative      Leukocyte Esterase Urine Negative      RBC Urine 1      WBC Urine 1      Squamous Epithelials Urine 1     HCG QUALITATIVE URINE - Normal    hCG Urine Qualitative Negative     BASIC METABOLIC PANEL - Normal "    Sodium 137      Potassium 3.7      Chloride 102      Carbon Dioxide (CO2) 23      Anion Gap 12      Urea Nitrogen 10.1      Creatinine 0.75      GFR Estimate >90      Calcium 9.6      Glucose 98         RADIOLOGY:  Reviewed all pertinent imaging. Please see official radiology report.  US Pelvic Complete w Transvaginal   Preliminary Result   IMPRESSION:   1.  The endometrium is at the upper limits of normal for a patient of this age at 15 mm. No focal endometrial abnormality is seen.      2.  Small intramural uterine fibroid should be of no clinical significance.      3.  No abnormal adnexal masses or significant free fluid.                 Harpreet Castro M.D.  Emergency Medicine  [facility] EMERGENCY DEPARTMENT  [address] [place] [id number] [phone]  Dept: [phone]       Harpreet Castro MD  05/05/25 [id number]

## 2025-08-25 ENCOUNTER — HOSPITAL ENCOUNTER (EMERGENCY)
Facility: HOSPITAL | Age: 45
End: 2025-08-25
Payer: COMMERCIAL